# Patient Record
Sex: MALE | Race: OTHER | Employment: FULL TIME | URBAN - NONMETROPOLITAN AREA
[De-identification: names, ages, dates, MRNs, and addresses within clinical notes are randomized per-mention and may not be internally consistent; named-entity substitution may affect disease eponyms.]

---

## 2017-09-12 ENCOUNTER — HOSPITAL ENCOUNTER (OUTPATIENT)
Age: 67
Discharge: HOME | End: 2017-09-12
Payer: COMMERCIAL

## 2017-09-12 LAB
ALBUMIN SERPL-MCNC: 4.1 G/DL (ref 3.5–5.1)
ALP BLD-CCNC: 63 U/L (ref 38–126)
ALT SERPL-CCNC: 23 U/L (ref 11–66)
ANION GAP SERPL CALCULATED.3IONS-SCNC: 12 MEQ/L (ref 8–16)
ANISOCYTOSIS: ABNORMAL
AST SERPL-CCNC: 17 U/L (ref 5–40)
BASOPHILS # BLD: 0.7 %
BASOPHILS ABSOLUTE: 0 THOU/MM3 (ref 0–0.1)
BILIRUB SERPL-MCNC: 0.2 MG/DL (ref 0.3–1.2)
BUN BLDV-MCNC: 19 MG/DL (ref 7–22)
CALCIUM SERPL-MCNC: 9.2 MG/DL (ref 8.5–10.5)
CHLORIDE BLD-SCNC: 102 MEQ/L (ref 98–111)
CHOLESTEROL, TOTAL: 131 MG/DL (ref 100–199)
CO2: 29 MEQ/L (ref 23–33)
CREAT SERPL-MCNC: 1 MG/DL (ref 0.4–1.2)
CREATININE, URINE: 125.7 MG/DL
EOSINOPHIL # BLD: 1.7 %
EOSINOPHILS ABSOLUTE: 0.1 THOU/MM3 (ref 0–0.4)
GFR SERPL CREATININE-BSD FRML MDRD: 75 ML/MIN/1.73M2
GLUCOSE BLD-MCNC: 143 MG/DL (ref 70–108)
HCT VFR BLD CALC: 44 % (ref 42–52)
HDLC SERPL-MCNC: 32 MG/DL
HEMOGLOBIN: 14.4 GM/DL (ref 14–18)
HYPOCHROMIA: ABNORMAL
LDL CHOLESTEROL CALCULATED: 75 MG/DL
LYMPHOCYTES # BLD: 31.5 %
LYMPHOCYTES ABSOLUTE: 1.7 THOU/MM3 (ref 1–4.8)
MCH RBC QN AUTO: 25.7 PG (ref 27–31)
MCHC RBC AUTO-ENTMCNC: 32.7 GM/DL (ref 33–37)
MCV RBC AUTO: 78.6 FL (ref 80–94)
MICROALBUMIN UR-MCNC: < 1.2 MG/DL
MICROALBUMIN/CREAT UR-RTO: 10 MG/G (ref 0–30)
MICROCYTES: ABNORMAL
MONOCYTES # BLD: 5.9 %
MONOCYTES ABSOLUTE: 0.3 THOU/MM3 (ref 0.4–1.3)
NUCLEATED RED BLOOD CELLS: 0 /100 WBC
PDW BLD-RTO: 14.6 % (ref 11.5–14.5)
PLATELET # BLD: 183 THOU/MM3 (ref 130–400)
PMV BLD AUTO: 8.7 MCM (ref 7.4–10.4)
POTASSIUM SERPL-SCNC: 4.4 MEQ/L (ref 3.5–5.2)
PROSTATE SPECIFIC ANTIGEN: 0.91 NG/ML (ref 0–1)
RBC # BLD: 5.59 MILL/MM3 (ref 4.7–6.1)
RBC # BLD: NORMAL 10*6/UL
SEG NEUTROPHILS: 60.2 %
SEGMENTED NEUTROPHILS ABSOLUTE COUNT: 3.3 THOU/MM3 (ref 1.8–7.7)
SODIUM BLD-SCNC: 143 MEQ/L (ref 135–145)
TOTAL PROTEIN: 7.1 G/DL (ref 6.1–8)
TRIGL SERPL-MCNC: 119 MG/DL (ref 0–199)
TSH SERPL DL<=0.05 MIU/L-ACNC: 1.75 UIU/ML (ref 0.4–4.2)
WBC # BLD: 5.5 THOU/MM3 (ref 4.8–10.8)

## 2017-09-12 PROCEDURE — 80061 LIPID PANEL: CPT

## 2017-09-12 PROCEDURE — 36415 COLL VENOUS BLD VENIPUNCTURE: CPT

## 2017-09-12 PROCEDURE — 80050 GENERAL HEALTH PANEL: CPT

## 2017-09-12 PROCEDURE — G0103 PSA SCREENING: HCPCS

## 2017-09-12 PROCEDURE — 82043 UR ALBUMIN QUANTITATIVE: CPT

## 2018-02-13 ENCOUNTER — HOSPITAL ENCOUNTER (OUTPATIENT)
Age: 68
Setting detail: OBSERVATION
Discharge: HOME | End: 2018-02-14
Attending: EMERGENCY MEDICINE | Admitting: INTERNAL MEDICINE
Payer: COMMERCIAL

## 2018-02-13 ENCOUNTER — APPOINTMENT (OUTPATIENT)
Dept: GENERAL RADIOLOGY | Age: 68
End: 2018-02-13
Payer: COMMERCIAL

## 2018-02-13 DIAGNOSIS — R07.89 OTHER CHEST PAIN: Primary | ICD-10-CM

## 2018-02-13 PROBLEM — R07.9 CHEST PAIN: Status: ACTIVE | Noted: 2018-02-13

## 2018-02-13 LAB
ANION GAP SERPL CALCULATED.3IONS-SCNC: 16 MEQ/L (ref 8–16)
BASOPHILS # BLD: 0.6 %
BASOPHILS ABSOLUTE: 0 THOU/MM3 (ref 0–0.1)
BUN BLDV-MCNC: 17 MG/DL (ref 7–22)
CALCIUM SERPL-MCNC: 9.6 MG/DL (ref 8.5–10.5)
CHLORIDE BLD-SCNC: 101 MEQ/L (ref 98–111)
CO2: 28 MEQ/L (ref 23–33)
CREAT SERPL-MCNC: 1 MG/DL (ref 0.4–1.2)
D-DIMER QUANTITATIVE: 518 NG/ML FEU (ref 0–500)
EOSINOPHIL # BLD: 1.4 %
EOSINOPHILS ABSOLUTE: 0.1 THOU/MM3 (ref 0–0.4)
GFR SERPL CREATININE-BSD FRML MDRD: 75 ML/MIN/1.73M2
GLUCOSE BLD-MCNC: 152 MG/DL (ref 70–108)
HCT VFR BLD CALC: 46.1 % (ref 42–52)
HEMOGLOBIN: 15 GM/DL (ref 14–18)
HYPOCHROMIA: ABNORMAL
LIPASE: 36.1 U/L (ref 5.6–51.3)
LYMPHOCYTES # BLD: 31.6 %
LYMPHOCYTES ABSOLUTE: 1.8 THOU/MM3 (ref 1–4.8)
MCH RBC QN AUTO: 25.3 PG (ref 27–31)
MCHC RBC AUTO-ENTMCNC: 32.5 GM/DL (ref 33–37)
MCV RBC AUTO: 77.9 FL (ref 80–94)
MICROCYTES: ABNORMAL
MONOCYTES # BLD: 5.7 %
MONOCYTES ABSOLUTE: 0.3 THOU/MM3 (ref 0.4–1.3)
NUCLEATED RED BLOOD CELLS: 0 /100 WBC
OSMOLALITY CALCULATION: 293.2 MOSMOL/KG (ref 275–300)
PDW BLD-RTO: 14.4 % (ref 11.5–14.5)
PLATELET # BLD: 219 THOU/MM3 (ref 130–400)
PMV BLD AUTO: 9.1 FL (ref 7.4–10.4)
POTASSIUM SERPL-SCNC: 3.8 MEQ/L (ref 3.5–5.2)
RBC # BLD: 5.91 MILL/MM3 (ref 4.7–6.1)
SEG NEUTROPHILS: 60.7 %
SEGMENTED NEUTROPHILS ABSOLUTE COUNT: 3.5 THOU/MM3 (ref 1.8–7.7)
SODIUM BLD-SCNC: 145 MEQ/L (ref 135–145)
TROPONIN T: < 0.01 NG/ML
TROPONIN T: < 0.01 NG/ML
WBC # BLD: 5.8 THOU/MM3 (ref 4.8–10.8)

## 2018-02-13 PROCEDURE — G0378 HOSPITAL OBSERVATION PER HR: HCPCS

## 2018-02-13 PROCEDURE — 6370000000 HC RX 637 (ALT 250 FOR IP): Performed by: INTERNAL MEDICINE

## 2018-02-13 PROCEDURE — 99285 EMERGENCY DEPT VISIT HI MDM: CPT

## 2018-02-13 PROCEDURE — 84484 ASSAY OF TROPONIN QUANT: CPT

## 2018-02-13 PROCEDURE — 36415 COLL VENOUS BLD VENIPUNCTURE: CPT

## 2018-02-13 PROCEDURE — 83690 ASSAY OF LIPASE: CPT

## 2018-02-13 PROCEDURE — 71046 X-RAY EXAM CHEST 2 VIEWS: CPT

## 2018-02-13 PROCEDURE — 85379 FIBRIN DEGRADATION QUANT: CPT

## 2018-02-13 PROCEDURE — 85025 COMPLETE CBC W/AUTO DIFF WBC: CPT

## 2018-02-13 PROCEDURE — 2580000003 HC RX 258: Performed by: INTERNAL MEDICINE

## 2018-02-13 PROCEDURE — 93005 ELECTROCARDIOGRAM TRACING: CPT | Performed by: EMERGENCY MEDICINE

## 2018-02-13 PROCEDURE — 80048 BASIC METABOLIC PNL TOTAL CA: CPT

## 2018-02-13 RX ORDER — ASPIRIN 325 MG
325 TABLET ORAL DAILY
Status: DISCONTINUED | OUTPATIENT
Start: 2018-02-13 | End: 2018-02-14 | Stop reason: HOSPADM

## 2018-02-13 RX ORDER — SODIUM CHLORIDE 0.9 % (FLUSH) 0.9 %
10 SYRINGE (ML) INJECTION PRN
Status: DISCONTINUED | OUTPATIENT
Start: 2018-02-13 | End: 2018-02-14 | Stop reason: HOSPADM

## 2018-02-13 RX ORDER — METOPROLOL SUCCINATE 25 MG/1
25 TABLET, EXTENDED RELEASE ORAL DAILY
Status: DISCONTINUED | OUTPATIENT
Start: 2018-02-14 | End: 2018-02-14 | Stop reason: HOSPADM

## 2018-02-13 RX ORDER — CLONAZEPAM 0.5 MG/1
0.5 TABLET ORAL NIGHTLY PRN
COMMUNITY

## 2018-02-13 RX ORDER — ZOLPIDEM TARTRATE 10 MG/1
10 TABLET ORAL NIGHTLY PRN
COMMUNITY

## 2018-02-13 RX ORDER — POTASSIUM CHLORIDE 7.45 MG/ML
10 INJECTION INTRAVENOUS PRN
Status: DISCONTINUED | OUTPATIENT
Start: 2018-02-13 | End: 2018-02-14 | Stop reason: HOSPADM

## 2018-02-13 RX ORDER — SODIUM CHLORIDE 0.9 % (FLUSH) 0.9 %
10 SYRINGE (ML) INJECTION EVERY 12 HOURS SCHEDULED
Status: DISCONTINUED | OUTPATIENT
Start: 2018-02-13 | End: 2018-02-14 | Stop reason: HOSPADM

## 2018-02-13 RX ORDER — ACETAMINOPHEN 325 MG/1
650 TABLET ORAL EVERY 4 HOURS PRN
Status: DISCONTINUED | OUTPATIENT
Start: 2018-02-13 | End: 2018-02-14 | Stop reason: HOSPADM

## 2018-02-13 RX ORDER — SODIUM CHLORIDE 9 MG/ML
INJECTION, SOLUTION INTRAVENOUS CONTINUOUS
Status: DISCONTINUED | OUTPATIENT
Start: 2018-02-13 | End: 2018-02-14 | Stop reason: HOSPADM

## 2018-02-13 RX ORDER — ROSUVASTATIN CALCIUM 10 MG/1
10 TABLET, COATED ORAL EVERY OTHER DAY
Status: ON HOLD | COMMUNITY
End: 2021-02-27 | Stop reason: SDUPTHER

## 2018-02-13 RX ORDER — ONDANSETRON 2 MG/ML
4 INJECTION INTRAMUSCULAR; INTRAVENOUS EVERY 6 HOURS PRN
Status: DISCONTINUED | OUTPATIENT
Start: 2018-02-13 | End: 2018-02-14 | Stop reason: HOSPADM

## 2018-02-13 RX ORDER — ZOLPIDEM TARTRATE 5 MG/1
5 TABLET ORAL NIGHTLY
Status: DISCONTINUED | OUTPATIENT
Start: 2018-02-13 | End: 2018-02-14 | Stop reason: HOSPADM

## 2018-02-13 RX ADMIN — ZOLPIDEM TARTRATE 5 MG: 5 TABLET ORAL at 22:44

## 2018-02-13 RX ADMIN — SODIUM CHLORIDE: 9 INJECTION, SOLUTION INTRAVENOUS at 21:39

## 2018-02-13 ASSESSMENT — PAIN DESCRIPTION - DESCRIPTORS
DESCRIPTORS: TIGHTNESS
DESCRIPTORS: PRESSURE
DESCRIPTORS: PRESSURE
DESCRIPTORS: TIGHTNESS
DESCRIPTORS: PRESSURE

## 2018-02-13 ASSESSMENT — PAIN DESCRIPTION - FREQUENCY
FREQUENCY: INTERMITTENT
FREQUENCY: CONTINUOUS
FREQUENCY: INTERMITTENT
FREQUENCY: CONTINUOUS

## 2018-02-13 ASSESSMENT — PAIN DESCRIPTION - PAIN TYPE
TYPE: ACUTE PAIN

## 2018-02-13 ASSESSMENT — PAIN SCALES - GENERAL
PAINLEVEL_OUTOF10: 5
PAINLEVEL_OUTOF10: 5
PAINLEVEL_OUTOF10: 4
PAINLEVEL_OUTOF10: 4
PAINLEVEL_OUTOF10: 5

## 2018-02-13 ASSESSMENT — ENCOUNTER SYMPTOMS
ABDOMINAL PAIN: 0
CHEST TIGHTNESS: 0
BACK PAIN: 0
SHORTNESS OF BREATH: 1
VOMITING: 0
DIARRHEA: 0
CONSTIPATION: 0
COUGH: 0
NAUSEA: 0
WHEEZING: 0
RHINORRHEA: 0
SORE THROAT: 0
BLOOD IN STOOL: 0

## 2018-02-13 ASSESSMENT — PAIN DESCRIPTION - ORIENTATION
ORIENTATION: MID;LOWER
ORIENTATION: MID;LEFT
ORIENTATION: MID;LEFT
ORIENTATION: MID

## 2018-02-13 ASSESSMENT — PAIN DESCRIPTION - LOCATION
LOCATION: CHEST

## 2018-02-13 NOTE — ED NOTES
Pt states that pressure continues to mid chest and upper epigastric areas. Pt rates pain as 5/10. Pt states tolerable. Pt voices no other c/o or needs at this time. UPdate given. Will continue to monitor.      Brianda Klein RN  02/13/18 3732

## 2018-02-13 NOTE — ED NOTES
Pt states that tightness continues in chest area rates as 5/10. Pt labs reviewed with him. Pt voices no other c/o or needs. Update given.      Jeanna Wynn RN  02/13/18 4496

## 2018-02-13 NOTE — ED PROVIDER NOTES
ms  QTc: 450 ms  P-R-T axes: 38, -59, 51  Sinus rhythm with premature atrial complexes  Left anterior fascicular block  No STEMI    RADIOLOGY: non-plain film images(s) such as CT, Ultrasound and MRI are read by the radiologist.    CTA CHEST W WO CONTRAST   Final Result    No pulmonary emboli or pulmonary infiltrates. **This report has been created using voice recognition software. It may contain minor errors which are inherent in voice recognition technology. **      Final report electronically signed by Dr. Yue Kathleen on 2/14/2018 3:37 PM      XR CHEST STANDARD (2 VW)   Final Result      No acute intrathoracic process. **This report has been created using voice recognition software. It may contain minor errors which are inherent in voice recognition technology. **      Final report electronically signed by Dr. Kashif Yanez on 2/13/2018 3:23 PM          LABS:   Labs Reviewed   CBC WITH AUTO DIFFERENTIAL - Abnormal; Notable for the following:        Result Value    MCV 77.9 (*)     MCH 25.3 (*)     MCHC 32.5 (*)     Monocytes # 0.3 (*)     All other components within normal limits   BASIC METABOLIC PANEL - Abnormal; Notable for the following:     Glucose 152 (*)     All other components within normal limits   GLOMERULAR FILTRATION RATE, ESTIMATED - Abnormal; Notable for the following:     Est, Glom Filt Rate 75 (*)     All other components within normal limits   D-DIMER, QUANTITATIVE - Abnormal; Notable for the following:     D-Dimer, Quant 518.00 (*)     All other components within normal limits   TROPONIN   LIPASE   ANION GAP   OSMOLALITY   TROPONIN   TROPONIN   LIPID PANEL       EMERGENCY DEPARTMENT COURSE:   Vitals:    Vitals:    02/14/18 0514 02/14/18 0516 02/14/18 0824 02/14/18 1551   BP: (!) 85/47 (!) 97/55 117/68 128/76   Pulse: 66 86 68 90   Resp: 16 18 18   Temp: 98.1 °F (36.7 °C)  97.4 °F (36.3 °C) 97.9 °F (36.6 °C)   TempSrc: Oral  Oral Oral   SpO2: 94%  94% 99%   Weight:

## 2018-02-13 NOTE — ED TRIAGE NOTES
Pt states that started with chest tightness and now gotten worse. EKG done in triage shown to [facility] in triage. IV started with lab draw.

## 2018-02-14 ENCOUNTER — APPOINTMENT (OUTPATIENT)
Dept: CT IMAGING | Age: 68
End: 2018-02-14
Payer: COMMERCIAL

## 2018-02-14 ENCOUNTER — APPOINTMENT (OUTPATIENT)
Dept: NON INVASIVE DIAGNOSTICS | Age: 68
End: 2018-02-14
Payer: COMMERCIAL

## 2018-02-14 VITALS
SYSTOLIC BLOOD PRESSURE: 128 MMHG | OXYGEN SATURATION: 99 % | DIASTOLIC BLOOD PRESSURE: 76 MMHG | WEIGHT: 169.4 LBS | RESPIRATION RATE: 18 BRPM | BODY MASS INDEX: 26.59 KG/M2 | HEIGHT: 67 IN | TEMPERATURE: 97.9 F | HEART RATE: 90 BPM

## 2018-02-14 PROBLEM — E11.10 UNCONTROLLED TYPE 2 DIABETES MELLITUS WITH KETOACIDOSIS WITHOUT COMA, WITHOUT LONG-TERM CURRENT USE OF INSULIN (HCC): Status: ACTIVE | Noted: 2018-02-14

## 2018-02-14 PROBLEM — I10 ESSENTIAL HYPERTENSION: Status: ACTIVE | Noted: 2018-02-14

## 2018-02-14 LAB
CHOLESTEROL, TOTAL: 149 MG/DL (ref 100–199)
EKG ATRIAL RATE: 66 BPM
EKG P AXIS: 51 DEGREES
EKG P-R INTERVAL: 174 MS
EKG Q-T INTERVAL: 388 MS
EKG QRS DURATION: 80 MS
EKG QTC CALCULATION (BAZETT): 406 MS
EKG R AXIS: -45 DEGREES
EKG T AXIS: 22 DEGREES
EKG VENTRICULAR RATE: 66 BPM
HDLC SERPL-MCNC: 34 MG/DL
LDL CHOLESTEROL CALCULATED: 95 MG/DL
LV EF: 64 %
LVEF MODALITY: NORMAL
TRIGL SERPL-MCNC: 100 MG/DL (ref 0–199)
TROPONIN T: < 0.01 NG/ML

## 2018-02-14 PROCEDURE — 71275 CT ANGIOGRAPHY CHEST: CPT

## 2018-02-14 PROCEDURE — 6360000004 HC RX CONTRAST MEDICATION: Performed by: INTERNAL MEDICINE

## 2018-02-14 PROCEDURE — 93017 CV STRESS TEST TRACING ONLY: CPT | Performed by: INTERNAL MEDICINE

## 2018-02-14 PROCEDURE — 99223 1ST HOSP IP/OBS HIGH 75: CPT | Performed by: INTERNAL MEDICINE

## 2018-02-14 PROCEDURE — 93010 ELECTROCARDIOGRAM REPORT: CPT | Performed by: INTERNAL MEDICINE

## 2018-02-14 PROCEDURE — 6370000000 HC RX 637 (ALT 250 FOR IP): Performed by: INTERNAL MEDICINE

## 2018-02-14 PROCEDURE — 78452 HT MUSCLE IMAGE SPECT MULT: CPT | Performed by: INTERNAL MEDICINE

## 2018-02-14 PROCEDURE — G0378 HOSPITAL OBSERVATION PER HR: HCPCS

## 2018-02-14 PROCEDURE — 93005 ELECTROCARDIOGRAM TRACING: CPT | Performed by: INTERNAL MEDICINE

## 2018-02-14 PROCEDURE — 3430000000 HC RX DIAGNOSTIC RADIOPHARMACEUTICAL: Performed by: INTERNAL MEDICINE

## 2018-02-14 PROCEDURE — 80061 LIPID PANEL: CPT

## 2018-02-14 PROCEDURE — 84484 ASSAY OF TROPONIN QUANT: CPT

## 2018-02-14 PROCEDURE — A9500 TC99M SESTAMIBI: HCPCS | Performed by: INTERNAL MEDICINE

## 2018-02-14 PROCEDURE — 36415 COLL VENOUS BLD VENIPUNCTURE: CPT

## 2018-02-14 RX ADMIN — Medication 32.9 MILLICURIE: at 12:15

## 2018-02-14 RX ADMIN — Medication 9.7 MILLICURIE: at 11:03

## 2018-02-14 RX ADMIN — NITROGLYCERIN 1 INCH: 20 OINTMENT TOPICAL at 00:50

## 2018-02-14 RX ADMIN — IOPAMIDOL 85 ML: 755 INJECTION, SOLUTION INTRAVENOUS at 14:55

## 2018-02-14 ASSESSMENT — PAIN DESCRIPTION - LOCATION
LOCATION: CHEST
LOCATION: CHEST

## 2018-02-14 ASSESSMENT — PAIN DESCRIPTION - FREQUENCY
FREQUENCY: INTERMITTENT
FREQUENCY: INTERMITTENT

## 2018-02-14 ASSESSMENT — PAIN SCALES - GENERAL
PAINLEVEL_OUTOF10: 1
PAINLEVEL_OUTOF10: 4
PAINLEVEL_OUTOF10: 0

## 2018-02-14 ASSESSMENT — PAIN DESCRIPTION - DESCRIPTORS
DESCRIPTORS: PRESSURE
DESCRIPTORS: PRESSURE

## 2018-02-14 ASSESSMENT — PAIN DESCRIPTION - PAIN TYPE
TYPE: ACUTE PAIN
TYPE: ACUTE PAIN

## 2018-02-14 ASSESSMENT — PAIN DESCRIPTION - ORIENTATION
ORIENTATION: MID;LEFT
ORIENTATION: LEFT;MID

## 2018-02-14 NOTE — CONSULTS
Take 5 mg by mouth daily  aspirin 81 MG tablet, Take 81 mg by mouth daily  cetirizine (ZYRTEC) 10 MG tablet, Take 10 mg by mouth daily    Allergies:    Review of patient's allergies indicates no known allergies. Social History:    reports that he has quit smoking. He has never used smokeless tobacco. He reports that he drinks alcohol. He reports that he does not use drugs. Family History:   family history is not on file. REVIEW OF SYSTEMS:  As above in the HPI, otherwise negative    PHYSICAL EXAM:    Vitals:  /68   Pulse 68   Temp 97.4 °F (36.3 °C) (Oral)   Resp 18   Ht 5' 7\" (1.702 m)   Wt 169 lb 6.4 oz (76.8 kg)   SpO2 94%   BMI 26.53 kg/m²     General Appearance: Alert and responsive, well developed and well- nourished, in no form of acute distress  Head: normocephalic and atraumatic  Eyes: pupils equal, round, and reactive to light, extraocular eye movements intact, conjunctivae normal  Neck: supple and non-tender without mass, no thyromegaly or thyroid nodules, no cervical lymphadenopathy  Pulmonary/Chest: clear to auscultation bilaterally- no wheezes, rales or rhonchi, normal air movement, no respiratory distress  Cardiovascular: Carotid and Femoral arteries are well felt, No bruit, NO JVD,   normal rate, regular rhythm, normal S1 and S2, no murmurs, rubs, clicks, or gallops,   Abdomen: soft, non-tender, non-distended, normal bowel sounds, no masses or organomegaly  ISAURO: no flank or CVA tenderness  Skin: warm and dry, no rash or erythema, No subcutaneous nodules  Extremities: no cyanosis or clubbing , NO Pedal/ptetibial edema, NO muscle or joint tenderness  Musculoskeletal: normal range of motion, no joint swelling, deformity or tenderness  CNS: AAOx3, Cranial nerves intact, sensation intact to touch and pain, Normal muscle strength and tone, DTR intact, No focal sign   Psychiatry: Normal Judgement, appropriate mood and affect.   LABS:  Lab Results   Component Value Date    TROPONINT <

## 2018-02-14 NOTE — FLOWSHEET NOTE
02/14/18 8740   Provider Notification   Reason for Communication Review case   Provider Name Dr. Saleem Araujo   Provider Notification Physician   Method of Communication Secure Message   Response Waiting for response   Notification Time 5554     RN perfect served Dr. Saleem Araujo:  Lizbeth Castro: [id number] Requesting Doctor: [address] [place] [address] Reason for Consult: chest pain. Pt to go for lexiscan stress test this morning. Stress lab stated you are to see before he goes down. \"

## 2018-02-14 NOTE — PLAN OF CARE
Problem: Cardiac Output - Decreased:  Goal: Hemodynamic stability will improve  Hemodynamic stability will improve  Outcome: Ongoing  Blood pressure within normal limits this shift. Patient displays no bleeding abnormalities. Monitoring labs frequently for any abnormalities. Problem: Tissue Perfusion - Cardiopulmonary, Altered:  Goal: Absence of angina  Absence of angina  Outcome: Ongoing  Patient displays no chest pain or angina this shift. Will continue to monitor for cardiac abnormalities. Comments: Care plan reviewed with patient. Patient verbalize understanding of the plan of care and contribute to goal setting.

## 2018-02-14 NOTE — DISCHARGE SUMMARY
acceptable limits. The heart size is normal. There is no pericardial or pleural effusion. Carlus Plank is no mediastinal, axillary or hilar adenopathy. The lungs are clear. There are no infiltrates.  No suspicious osseous lesions are present.    Multiple hepatic cysts. Mild thickening of the right adrenal gland versus small nodule. Probable exophytic right renal subcentimeter cyst   Impression:      No pulmonary emboli or pulmonary infiltrates. Exercise cardiolite stress test:  No stress induced reversible ischemia    Assessment and Plan   1. Chest pain.   2. HTN  3. DM 2     Treatments:   ASA, BB, statin, lovenox    Disposition:  home    Signed:  José Miguel Bhatt  2/14/2018, 4:14 PM

## 2018-02-14 NOTE — CARE COORDINATION
Denies needs. Discharge plan discussed by  and . Discharge plan reviewed with patient/ family. Patient/ family verbalize understanding of discharge plan and are in agreement with plan. Understanding was demonstrated using the teach back method. IMM Letter  Observation Status Letter date given[de-identified] 02/13/18  Observation Status Letter time given[de-identified] 2200  Observation Status Letter given to Patient/Family/Significant other/Guardian/POA/by[de-identified] Notice of observation signed with primary RN. Verbal explanation given at this time. No questions or concerns voiced.

## 2018-02-15 LAB
EKG ATRIAL RATE: 91 BPM
EKG P AXIS: 38 DEGREES
EKG P-R INTERVAL: 160 MS
EKG Q-T INTERVAL: 366 MS
EKG QRS DURATION: 80 MS
EKG QTC CALCULATION (BAZETT): 450 MS
EKG R AXIS: -59 DEGREES
EKG T AXIS: 51 DEGREES
EKG VENTRICULAR RATE: 91 BPM

## 2018-02-15 PROCEDURE — 93010 ELECTROCARDIOGRAM REPORT: CPT | Performed by: INTERNAL MEDICINE

## 2018-02-21 ASSESSMENT — ENCOUNTER SYMPTOMS
VOMITING: 0
WHEEZING: 0
DIARRHEA: 0
BLOOD IN STOOL: 0
RHINORRHEA: 0
CHEST TIGHTNESS: 0
SORE THROAT: 0
SHORTNESS OF BREATH: 1
BACK PAIN: 0
ABDOMINAL PAIN: 0
CONSTIPATION: 0
COUGH: 0
NAUSEA: 0

## 2018-03-12 ENCOUNTER — OFFICE VISIT (OUTPATIENT)
Dept: CARDIOLOGY CLINIC | Age: 68
End: 2018-03-12
Payer: COMMERCIAL

## 2018-03-12 VITALS
HEART RATE: 80 BPM | DIASTOLIC BLOOD PRESSURE: 84 MMHG | SYSTOLIC BLOOD PRESSURE: 134 MMHG | BODY MASS INDEX: 27.92 KG/M2 | HEIGHT: 67 IN | WEIGHT: 177.9 LBS

## 2018-03-12 DIAGNOSIS — I25.10 ASCVD (ARTERIOSCLEROTIC CARDIOVASCULAR DISEASE): Primary | ICD-10-CM

## 2018-03-12 DIAGNOSIS — R53.83 OTHER FATIGUE: ICD-10-CM

## 2018-03-12 PROCEDURE — G8419 CALC BMI OUT NRM PARAM NOF/U: HCPCS | Performed by: INTERNAL MEDICINE

## 2018-03-12 PROCEDURE — 4040F PNEUMOC VAC/ADMIN/RCVD: CPT | Performed by: INTERNAL MEDICINE

## 2018-03-12 PROCEDURE — 1036F TOBACCO NON-USER: CPT | Performed by: INTERNAL MEDICINE

## 2018-03-12 PROCEDURE — G8427 DOCREV CUR MEDS BY ELIG CLIN: HCPCS | Performed by: INTERNAL MEDICINE

## 2018-03-12 PROCEDURE — G8598 ASA/ANTIPLAT THER USED: HCPCS | Performed by: INTERNAL MEDICINE

## 2018-03-12 PROCEDURE — G8482 FLU IMMUNIZE ORDER/ADMIN: HCPCS | Performed by: INTERNAL MEDICINE

## 2018-03-12 PROCEDURE — 99204 OFFICE O/P NEW MOD 45 MIN: CPT | Performed by: INTERNAL MEDICINE

## 2018-03-12 PROCEDURE — 3017F COLORECTAL CA SCREEN DOC REV: CPT | Performed by: INTERNAL MEDICINE

## 2018-03-12 PROCEDURE — 1123F ACP DISCUSS/DSCN MKR DOCD: CPT | Performed by: INTERNAL MEDICINE

## 2018-03-12 ASSESSMENT — ENCOUNTER SYMPTOMS
DIARRHEA: 0
ABDOMINAL PAIN: 0
CONSTIPATION: 0
EYE PAIN: 0
BOWEL INCONTINENCE: 0
HOARSE VOICE: 0
CHANGE IN BOWEL HABIT: 0
EYE DISCHARGE: 0
BLOATING: 0
DOUBLE VISION: 0
HEMOPTYSIS: 0
COUGH: 0
SHORTNESS OF BREATH: 0
BLURRED VISION: 0
BACK PAIN: 0
SPUTUM PRODUCTION: 0
ORTHOPNEA: 0

## 2018-03-12 NOTE — PROGRESS NOTES
New pt ref from PCP for chest tightness   Pt was [facility] for chest tightness   denies SOB
normal. No respiratory distress. He has no wheezes. He has no rales. Abdominal: Soft. Bowel sounds are normal. He exhibits no distension. There is no tenderness. Musculoskeletal: Normal range of motion. He exhibits no edema. Neurological: He is alert and oriented to person, place, and time. No cranial nerve deficit. Coordination normal.   Skin: Skin is warm and dry. Psychiatric: He has a normal mood and affect. Nursing note and vitals reviewed. No results found for: CKTOTAL, CKMB, CKMBINDEX    Lab Results   Component Value Date    WBC 5.8 02/13/2018    RBC 5.91 02/13/2018    HGB 15.0 02/13/2018    HCT 46.1 02/13/2018    MCV 77.9 02/13/2018    MCH 25.3 02/13/2018    MCHC 32.5 02/13/2018    RDW 14.4 02/13/2018     02/13/2018    MPV 9.1 02/13/2018       Lab Results   Component Value Date     02/13/2018    K 3.8 02/13/2018     02/13/2018    CO2 28 02/13/2018    BUN 17 02/13/2018    LABALBU 4.1 09/12/2017    CREATININE 1.0 02/13/2018    CALCIUM 9.6 02/13/2018    LABGLOM 75 02/13/2018    GLUCOSE 152 02/13/2018       Lab Results   Component Value Date    ALKPHOS 63 09/12/2017    ALT 23 09/12/2017    AST 17 09/12/2017    PROT 7.1 09/12/2017    BILITOT 0.2 09/12/2017    LABALBU 4.1 09/12/2017       No results found for: MG    No results found for: INR, PROTIME      No results found for: LABA1C    Lab Results   Component Value Date    TRIG 100 02/14/2018    HDL 34 02/14/2018    LDLCALC 95 02/14/2018       Lab Results   Component Value Date    TSH 1.750 09/12/2017         Testing Reviewed:      I have individually reviewed the below cardiac tests    EKG:SR, PACs    ECHO: No results found for this or any previous visit. STRESS:2/14/18:Imaging Results:Calculated gated LVEF 64 %. The T.I.D. ratio was 1 .   No evidence of significant reversible or fixed defects on scintigraphic  images.     Conclusions      Summary   No evidence of significant reversible or fixed defects on

## 2018-06-15 ENCOUNTER — ANESTHESIA (OUTPATIENT)
Dept: ENDOSCOPY | Age: 68
End: 2018-06-15
Payer: COMMERCIAL

## 2018-06-15 ENCOUNTER — HOSPITAL ENCOUNTER (OUTPATIENT)
Age: 68
Setting detail: OUTPATIENT SURGERY
Discharge: HOME | End: 2018-06-15
Attending: INTERNAL MEDICINE | Admitting: INTERNAL MEDICINE
Payer: COMMERCIAL

## 2018-06-15 ENCOUNTER — ANESTHESIA EVENT (OUTPATIENT)
Dept: ENDOSCOPY | Age: 68
End: 2018-06-15
Payer: COMMERCIAL

## 2018-06-15 VITALS
HEART RATE: 85 BPM | BODY MASS INDEX: 27.88 KG/M2 | WEIGHT: 177.6 LBS | DIASTOLIC BLOOD PRESSURE: 57 MMHG | HEIGHT: 67 IN | SYSTOLIC BLOOD PRESSURE: 97 MMHG | TEMPERATURE: 97.6 F | RESPIRATION RATE: 16 BRPM | OXYGEN SATURATION: 93 %

## 2018-06-15 VITALS
RESPIRATION RATE: 13 BRPM | SYSTOLIC BLOOD PRESSURE: 95 MMHG | DIASTOLIC BLOOD PRESSURE: 52 MMHG | OXYGEN SATURATION: 98 %

## 2018-06-15 PROCEDURE — 88305 TISSUE EXAM BY PATHOLOGIST: CPT

## 2018-06-15 PROCEDURE — 6360000002 HC RX W HCPCS: Performed by: REGISTERED NURSE

## 2018-06-15 PROCEDURE — 3700000000 HC ANESTHESIA ATTENDED CARE: Performed by: INTERNAL MEDICINE

## 2018-06-15 PROCEDURE — 2500000003 HC RX 250 WO HCPCS: Performed by: REGISTERED NURSE

## 2018-06-15 PROCEDURE — 2580000003 HC RX 258: Performed by: INTERNAL MEDICINE

## 2018-06-15 PROCEDURE — 7100000001 HC PACU RECOVERY - ADDTL 15 MIN: Performed by: INTERNAL MEDICINE

## 2018-06-15 PROCEDURE — 7100000000 HC PACU RECOVERY - FIRST 15 MIN: Performed by: INTERNAL MEDICINE

## 2018-06-15 PROCEDURE — 3609010600 HC COLONOSCOPY POLYPECTOMY SNARE/COLD BIOPSY: Performed by: INTERNAL MEDICINE

## 2018-06-15 PROCEDURE — 3700000001 HC ADD 15 MINUTES (ANESTHESIA): Performed by: INTERNAL MEDICINE

## 2018-06-15 RX ORDER — PROPOFOL 10 MG/ML
INJECTION, EMULSION INTRAVENOUS PRN
Status: DISCONTINUED | OUTPATIENT
Start: 2018-06-15 | End: 2018-06-15 | Stop reason: SDUPTHER

## 2018-06-15 RX ORDER — SODIUM CHLORIDE 450 MG/100ML
INJECTION, SOLUTION INTRAVENOUS CONTINUOUS
Status: DISCONTINUED | OUTPATIENT
Start: 2018-06-15 | End: 2018-06-15 | Stop reason: HOSPADM

## 2018-06-15 RX ORDER — KETAMINE HYDROCHLORIDE 50 MG/ML
INJECTION, SOLUTION, CONCENTRATE INTRAMUSCULAR; INTRAVENOUS PRN
Status: DISCONTINUED | OUTPATIENT
Start: 2018-06-15 | End: 2018-06-15 | Stop reason: SDUPTHER

## 2018-06-15 RX ADMIN — KETAMINE HYDROCHLORIDE 20 MG: 50 INJECTION, SOLUTION INTRAMUSCULAR; INTRAVENOUS at 08:13

## 2018-06-15 RX ADMIN — PROPOFOL 300 MG: 10 INJECTION, EMULSION INTRAVENOUS at 08:13

## 2018-06-15 RX ADMIN — SODIUM CHLORIDE: 4.5 INJECTION, SOLUTION INTRAVENOUS at 07:27

## 2018-06-15 ASSESSMENT — PAIN SCALES - GENERAL: PAINLEVEL_OUTOF10: 0

## 2018-10-05 ENCOUNTER — OFFICE VISIT (OUTPATIENT)
Dept: CARDIOLOGY CLINIC | Age: 68
End: 2018-10-05
Payer: COMMERCIAL

## 2018-10-05 VITALS
WEIGHT: 178 LBS | BODY MASS INDEX: 27.94 KG/M2 | DIASTOLIC BLOOD PRESSURE: 70 MMHG | HEART RATE: 72 BPM | HEIGHT: 67 IN | SYSTOLIC BLOOD PRESSURE: 144 MMHG

## 2018-10-05 DIAGNOSIS — R00.2 PALPITATIONS: Primary | ICD-10-CM

## 2018-10-05 PROCEDURE — 1101F PT FALLS ASSESS-DOCD LE1/YR: CPT | Performed by: INTERNAL MEDICINE

## 2018-10-05 PROCEDURE — G8598 ASA/ANTIPLAT THER USED: HCPCS | Performed by: INTERNAL MEDICINE

## 2018-10-05 PROCEDURE — 1123F ACP DISCUSS/DSCN MKR DOCD: CPT | Performed by: INTERNAL MEDICINE

## 2018-10-05 PROCEDURE — G8484 FLU IMMUNIZE NO ADMIN: HCPCS | Performed by: INTERNAL MEDICINE

## 2018-10-05 PROCEDURE — 3017F COLORECTAL CA SCREEN DOC REV: CPT | Performed by: INTERNAL MEDICINE

## 2018-10-05 PROCEDURE — 4040F PNEUMOC VAC/ADMIN/RCVD: CPT | Performed by: INTERNAL MEDICINE

## 2018-10-05 PROCEDURE — G8427 DOCREV CUR MEDS BY ELIG CLIN: HCPCS | Performed by: INTERNAL MEDICINE

## 2018-10-05 PROCEDURE — G8419 CALC BMI OUT NRM PARAM NOF/U: HCPCS | Performed by: INTERNAL MEDICINE

## 2018-10-05 PROCEDURE — 1036F TOBACCO NON-USER: CPT | Performed by: INTERNAL MEDICINE

## 2018-10-05 PROCEDURE — 99213 OFFICE O/P EST LOW 20 MIN: CPT | Performed by: INTERNAL MEDICINE

## 2018-10-05 ASSESSMENT — ENCOUNTER SYMPTOMS
BOWEL INCONTINENCE: 0
BLOATING: 0
DIARRHEA: 0
ORTHOPNEA: 0
EYE PAIN: 0
CONSTIPATION: 0
ABDOMINAL PAIN: 0
SPUTUM PRODUCTION: 0
SHORTNESS OF BREATH: 0
BLURRED VISION: 0
COUGH: 0
CHANGE IN BOWEL HABIT: 0
HEMOPTYSIS: 0
HOARSE VOICE: 0
DOUBLE VISION: 0
BACK PAIN: 0
EYE DISCHARGE: 0

## 2018-10-09 ENCOUNTER — HOSPITAL ENCOUNTER (OUTPATIENT)
Dept: NON INVASIVE DIAGNOSTICS | Age: 68
Discharge: HOME | End: 2018-10-09
Payer: COMMERCIAL

## 2018-10-09 DIAGNOSIS — R00.2 PALPITATIONS: ICD-10-CM

## 2018-10-09 PROCEDURE — 0296T HC EXT ECG RECORDING 2-21 DAY HOOKUP: CPT

## 2021-02-26 ENCOUNTER — HOSPITAL ENCOUNTER (INPATIENT)
Age: 71
LOS: 1 days | Discharge: HOME | DRG: 311 | End: 2021-02-27
Attending: INTERNAL MEDICINE | Admitting: INTERNAL MEDICINE
Payer: MEDICARE

## 2021-02-26 ENCOUNTER — APPOINTMENT (OUTPATIENT)
Dept: NON INVASIVE DIAGNOSTICS | Age: 71
DRG: 311 | End: 2021-02-26
Payer: MEDICARE

## 2021-02-26 ENCOUNTER — APPOINTMENT (OUTPATIENT)
Dept: CT IMAGING | Age: 71
DRG: 311 | End: 2021-02-26
Payer: MEDICARE

## 2021-02-26 DIAGNOSIS — I71.20 THORACIC AORTIC ANEURYSM WITHOUT RUPTURE: ICD-10-CM

## 2021-02-26 DIAGNOSIS — G47.34 IDIOPATHIC SLEEP RELATED NONOBSTRUCTIVE ALVEOLAR HYPOVENTILATION: ICD-10-CM

## 2021-02-26 DIAGNOSIS — E11.10 UNCONTROLLED TYPE 2 DIABETES MELLITUS WITH KETOACIDOSIS WITHOUT COMA, WITHOUT LONG-TERM CURRENT USE OF INSULIN (HCC): ICD-10-CM

## 2021-02-26 DIAGNOSIS — R07.9 CHEST PAIN, UNSPECIFIED TYPE: Primary | ICD-10-CM

## 2021-02-26 LAB
ANION GAP SERPL CALCULATED.3IONS-SCNC: 9 MEQ/L (ref 8–16)
AVERAGE GLUCOSE: 192 MG/DL (ref 70–126)
BASOPHILS # BLD: 0.5 %
BASOPHILS ABSOLUTE: 0 THOU/MM3 (ref 0–0.1)
BUN BLDV-MCNC: 20 MG/DL (ref 7–22)
CALCIUM SERPL-MCNC: 10 MG/DL (ref 8.5–10.5)
CHLORIDE BLD-SCNC: 105 MEQ/L (ref 98–111)
CHOLESTEROL, TOTAL: 190 MG/DL (ref 100–199)
CO2: 27 MEQ/L (ref 23–33)
CREAT SERPL-MCNC: 0.8 MG/DL (ref 0.4–1.2)
EKG ATRIAL RATE: 73 BPM
EKG P AXIS: 58 DEGREES
EKG P-R INTERVAL: 190 MS
EKG Q-T INTERVAL: 382 MS
EKG QRS DURATION: 84 MS
EKG QTC CALCULATION (BAZETT): 420 MS
EKG R AXIS: -55 DEGREES
EKG T AXIS: 41 DEGREES
EKG VENTRICULAR RATE: 73 BPM
EOSINOPHIL # BLD: 0.9 %
EOSINOPHILS ABSOLUTE: 0.1 THOU/MM3 (ref 0–0.4)
ERYTHROCYTE [DISTWIDTH] IN BLOOD BY AUTOMATED COUNT: 13.2 % (ref 11.5–14.5)
ERYTHROCYTE [DISTWIDTH] IN BLOOD BY AUTOMATED COUNT: 38.1 FL (ref 35–45)
GFR SERPL CREATININE-BSD FRML MDRD: > 90 ML/MIN/1.73M2
GLUCOSE BLD-MCNC: 117 MG/DL (ref 70–108)
GLUCOSE BLD-MCNC: 191 MG/DL (ref 70–108)
GLUCOSE BLD-MCNC: 211 MG/DL (ref 70–108)
HBA1C MFR BLD: 8.4 % (ref 4.4–6.4)
HCT VFR BLD CALC: 47.4 % (ref 42–52)
HDLC SERPL-MCNC: 37 MG/DL
HEMOGLOBIN: 15 GM/DL (ref 14–18)
IMMATURE GRANS (ABS): 0.01 THOU/MM3 (ref 0–0.07)
IMMATURE GRANULOCYTES: 0.2 %
LDL CHOLESTEROL CALCULATED: 120 MG/DL
LV EF: 60 %
LVEF MODALITY: NORMAL
LYMPHOCYTES # BLD: 25.5 %
LYMPHOCYTES ABSOLUTE: 1.7 THOU/MM3 (ref 1–4.8)
MAGNESIUM: 1.9 MG/DL (ref 1.6–2.4)
MCH RBC QN AUTO: 25.7 PG (ref 26–33)
MCHC RBC AUTO-ENTMCNC: 31.6 GM/DL (ref 32.2–35.5)
MCV RBC AUTO: 81.2 FL (ref 80–94)
MONOCYTES # BLD: 6.7 %
MONOCYTES ABSOLUTE: 0.4 THOU/MM3 (ref 0.4–1.3)
NUCLEATED RED BLOOD CELLS: 0 /100 WBC
OSMOLALITY CALCULATION: 289 MOSMOL/KG (ref 275–300)
PLATELET # BLD: 198 THOU/MM3 (ref 130–400)
PMV BLD AUTO: 9.7 FL (ref 9.4–12.4)
POTASSIUM SERPL-SCNC: 4 MEQ/L (ref 3.5–5.2)
PRO-BNP: 34.1 PG/ML (ref 0–900)
RBC # BLD: 5.84 MILL/MM3 (ref 4.7–6.1)
SEG NEUTROPHILS: 66.2 %
SEGMENTED NEUTROPHILS ABSOLUTE COUNT: 4.4 THOU/MM3 (ref 1.8–7.7)
SODIUM BLD-SCNC: 141 MEQ/L (ref 135–145)
TRIGL SERPL-MCNC: 167 MG/DL (ref 0–199)
TROPONIN T: < 0.01 NG/ML
WBC # BLD: 6.6 THOU/MM3 (ref 4.8–10.8)

## 2021-02-26 PROCEDURE — 93005 ELECTROCARDIOGRAM TRACING: CPT | Performed by: PHYSICIAN ASSISTANT

## 2021-02-26 PROCEDURE — 71275 CT ANGIOGRAPHY CHEST: CPT

## 2021-02-26 PROCEDURE — 80061 LIPID PANEL: CPT

## 2021-02-26 PROCEDURE — 83735 ASSAY OF MAGNESIUM: CPT

## 2021-02-26 PROCEDURE — 93010 ELECTROCARDIOGRAM REPORT: CPT | Performed by: NUCLEAR MEDICINE

## 2021-02-26 PROCEDURE — 93017 CV STRESS TEST TRACING ONLY: CPT

## 2021-02-26 PROCEDURE — 6370000000 HC RX 637 (ALT 250 FOR IP): Performed by: PHYSICIAN ASSISTANT

## 2021-02-26 PROCEDURE — 3430000000 HC RX DIAGNOSTIC RADIOPHARMACEUTICAL: Performed by: INTERNAL MEDICINE

## 2021-02-26 PROCEDURE — 78452 HT MUSCLE IMAGE SPECT MULT: CPT

## 2021-02-26 PROCEDURE — 83036 HEMOGLOBIN GLYCOSYLATED A1C: CPT

## 2021-02-26 PROCEDURE — 6360000004 HC RX CONTRAST MEDICATION: Performed by: PHYSICIAN ASSISTANT

## 2021-02-26 PROCEDURE — 6360000002 HC RX W HCPCS

## 2021-02-26 PROCEDURE — 84484 ASSAY OF TROPONIN QUANT: CPT

## 2021-02-26 PROCEDURE — 82948 REAGENT STRIP/BLOOD GLUCOSE: CPT

## 2021-02-26 PROCEDURE — 85025 COMPLETE CBC W/AUTO DIFF WBC: CPT

## 2021-02-26 PROCEDURE — 83880 ASSAY OF NATRIURETIC PEPTIDE: CPT

## 2021-02-26 PROCEDURE — 99223 1ST HOSP IP/OBS HIGH 75: CPT | Performed by: INTERNAL MEDICINE

## 2021-02-26 PROCEDURE — 80048 BASIC METABOLIC PNL TOTAL CA: CPT

## 2021-02-26 PROCEDURE — 93306 TTE W/DOPPLER COMPLETE: CPT

## 2021-02-26 PROCEDURE — 36415 COLL VENOUS BLD VENIPUNCTURE: CPT

## 2021-02-26 PROCEDURE — 99285 EMERGENCY DEPT VISIT HI MDM: CPT

## 2021-02-26 PROCEDURE — 6370000000 HC RX 637 (ALT 250 FOR IP): Performed by: INTERNAL MEDICINE

## 2021-02-26 PROCEDURE — A9500 TC99M SESTAMIBI: HCPCS | Performed by: INTERNAL MEDICINE

## 2021-02-26 PROCEDURE — 1200000003 HC TELEMETRY R&B

## 2021-02-26 PROCEDURE — 6360000002 HC RX W HCPCS: Performed by: INTERNAL MEDICINE

## 2021-02-26 PROCEDURE — 2580000003 HC RX 258: Performed by: INTERNAL MEDICINE

## 2021-02-26 RX ORDER — DOCUSATE SODIUM 100 MG/1
100 CAPSULE, LIQUID FILLED ORAL DAILY PRN
COMMUNITY

## 2021-02-26 RX ORDER — NITROGLYCERIN 20 MG/100ML
5-200 INJECTION INTRAVENOUS CONTINUOUS
Status: DISCONTINUED | OUTPATIENT
Start: 2021-02-26 | End: 2021-02-27 | Stop reason: HOSPADM

## 2021-02-26 RX ORDER — POLYETHYLENE GLYCOL 3350 17 G/17G
17 POWDER, FOR SOLUTION ORAL DAILY PRN
Status: DISCONTINUED | OUTPATIENT
Start: 2021-02-26 | End: 2021-02-27 | Stop reason: HOSPADM

## 2021-02-26 RX ORDER — CLONAZEPAM 1 MG/1
0.5 TABLET ORAL NIGHTLY PRN
Status: DISCONTINUED | OUTPATIENT
Start: 2021-02-26 | End: 2021-02-27 | Stop reason: HOSPADM

## 2021-02-26 RX ORDER — SODIUM CHLORIDE 0.9 % (FLUSH) 0.9 %
10 SYRINGE (ML) INJECTION PRN
Status: ACTIVE | OUTPATIENT
Start: 2021-02-26 | End: 2021-02-26

## 2021-02-26 RX ORDER — METOPROLOL SUCCINATE 25 MG/1
25 TABLET, EXTENDED RELEASE ORAL DAILY
Status: DISCONTINUED | OUTPATIENT
Start: 2021-02-26 | End: 2021-02-27 | Stop reason: HOSPADM

## 2021-02-26 RX ORDER — PROMETHAZINE HYDROCHLORIDE 25 MG/1
12.5 TABLET ORAL EVERY 6 HOURS PRN
Status: DISCONTINUED | OUTPATIENT
Start: 2021-02-26 | End: 2021-02-27 | Stop reason: HOSPADM

## 2021-02-26 RX ORDER — NICOTINE POLACRILEX 4 MG
15 LOZENGE BUCCAL PRN
Status: DISCONTINUED | OUTPATIENT
Start: 2021-02-26 | End: 2021-02-27 | Stop reason: HOSPADM

## 2021-02-26 RX ORDER — ACETAMINOPHEN 650 MG/1
650 SUPPOSITORY RECTAL EVERY 6 HOURS PRN
Status: DISCONTINUED | OUTPATIENT
Start: 2021-02-26 | End: 2021-02-27 | Stop reason: HOSPADM

## 2021-02-26 RX ORDER — ASPIRIN 81 MG/1
81 TABLET ORAL DAILY
Status: DISCONTINUED | OUTPATIENT
Start: 2021-02-27 | End: 2021-02-27 | Stop reason: HOSPADM

## 2021-02-26 RX ORDER — ASPIRIN 81 MG/1
81 TABLET, CHEWABLE ORAL DAILY
Status: DISCONTINUED | OUTPATIENT
Start: 2021-02-27 | End: 2021-02-26 | Stop reason: ALTCHOICE

## 2021-02-26 RX ORDER — NITROGLYCERIN 0.4 MG/1
0.4 TABLET SUBLINGUAL EVERY 5 MIN PRN
Status: DISCONTINUED | OUTPATIENT
Start: 2021-02-26 | End: 2021-02-26 | Stop reason: SDUPTHER

## 2021-02-26 RX ORDER — SODIUM CHLORIDE 9 MG/ML
500 INJECTION, SOLUTION INTRAVENOUS CONTINUOUS PRN
Status: ACTIVE | OUTPATIENT
Start: 2021-02-26 | End: 2021-02-26

## 2021-02-26 RX ORDER — SODIUM CHLORIDE 0.9 % (FLUSH) 0.9 %
10 SYRINGE (ML) INJECTION PRN
Status: DISCONTINUED | OUTPATIENT
Start: 2021-02-26 | End: 2021-02-27 | Stop reason: HOSPADM

## 2021-02-26 RX ORDER — ALBUTEROL SULFATE 90 UG/1
2 AEROSOL, METERED RESPIRATORY (INHALATION) PRN
Status: ACTIVE | OUTPATIENT
Start: 2021-02-26 | End: 2021-02-26

## 2021-02-26 RX ORDER — ONDANSETRON 2 MG/ML
4 INJECTION INTRAMUSCULAR; INTRAVENOUS EVERY 6 HOURS PRN
Status: DISCONTINUED | OUTPATIENT
Start: 2021-02-26 | End: 2021-02-27 | Stop reason: HOSPADM

## 2021-02-26 RX ORDER — DEXTROSE MONOHYDRATE 25 G/50ML
12.5 INJECTION, SOLUTION INTRAVENOUS PRN
Status: DISCONTINUED | OUTPATIENT
Start: 2021-02-26 | End: 2021-02-27 | Stop reason: HOSPADM

## 2021-02-26 RX ORDER — AMINOPHYLLINE DIHYDRATE 25 MG/ML
50 INJECTION, SOLUTION INTRAVENOUS PRN
Status: ACTIVE | OUTPATIENT
Start: 2021-02-26 | End: 2021-02-26

## 2021-02-26 RX ORDER — SODIUM CHLORIDE 0.9 % (FLUSH) 0.9 %
10 SYRINGE (ML) INJECTION EVERY 12 HOURS SCHEDULED
Status: DISCONTINUED | OUTPATIENT
Start: 2021-02-26 | End: 2021-02-27 | Stop reason: HOSPADM

## 2021-02-26 RX ORDER — ROSUVASTATIN CALCIUM 10 MG/1
10 TABLET, COATED ORAL NIGHTLY
Status: DISCONTINUED | OUTPATIENT
Start: 2021-02-26 | End: 2021-02-27 | Stop reason: HOSPADM

## 2021-02-26 RX ORDER — ATROPINE SULFATE 0.1 MG/ML
0.5 INJECTION INTRAVENOUS EVERY 5 MIN PRN
Status: ACTIVE | OUTPATIENT
Start: 2021-02-26 | End: 2021-02-26

## 2021-02-26 RX ORDER — METOPROLOL TARTRATE 5 MG/5ML
5 INJECTION INTRAVENOUS EVERY 5 MIN PRN
Status: ACTIVE | OUTPATIENT
Start: 2021-02-26 | End: 2021-02-26

## 2021-02-26 RX ORDER — ASPIRIN 81 MG/1
243 TABLET, CHEWABLE ORAL ONCE
Status: COMPLETED | OUTPATIENT
Start: 2021-02-26 | End: 2021-02-26

## 2021-02-26 RX ORDER — NITROGLYCERIN 0.4 MG/1
0.4 TABLET SUBLINGUAL EVERY 5 MIN PRN
Status: DISCONTINUED | OUTPATIENT
Start: 2021-02-26 | End: 2021-02-27 | Stop reason: HOSPADM

## 2021-02-26 RX ORDER — DEXTROSE MONOHYDRATE 50 MG/ML
100 INJECTION, SOLUTION INTRAVENOUS PRN
Status: DISCONTINUED | OUTPATIENT
Start: 2021-02-26 | End: 2021-02-27 | Stop reason: HOSPADM

## 2021-02-26 RX ORDER — ACETAMINOPHEN 325 MG/1
650 TABLET ORAL EVERY 6 HOURS PRN
Status: DISCONTINUED | OUTPATIENT
Start: 2021-02-26 | End: 2021-02-27 | Stop reason: HOSPADM

## 2021-02-26 RX ADMIN — Medication 9 MILLICURIE: at 14:00

## 2021-02-26 RX ADMIN — ROSUVASTATIN CALCIUM 10 MG: 10 TABLET, FILM COATED ORAL at 20:25

## 2021-02-26 RX ADMIN — ENOXAPARIN SODIUM 80 MG: 80 INJECTION SUBCUTANEOUS at 16:30

## 2021-02-26 RX ADMIN — SODIUM CHLORIDE, PRESERVATIVE FREE 10 ML: 5 INJECTION INTRAVENOUS at 20:25

## 2021-02-26 RX ADMIN — CLONAZEPAM 0.5 MG: 1 TABLET ORAL at 20:25

## 2021-02-26 RX ADMIN — NITROGLYCERIN 0.4 MG: 0.4 TABLET, ORALLY DISINTEGRATING SUBLINGUAL at 09:15

## 2021-02-26 RX ADMIN — ASPIRIN 243 MG: 81 TABLET, CHEWABLE ORAL at 09:15

## 2021-02-26 RX ADMIN — INSULIN LISPRO 2 UNITS: 100 INJECTION, SOLUTION INTRAVENOUS; SUBCUTANEOUS at 16:06

## 2021-02-26 RX ADMIN — NITROGLYCERIN 0.4 MG: 0.4 TABLET, ORALLY DISINTEGRATING SUBLINGUAL at 09:20

## 2021-02-26 RX ADMIN — METOPROLOL SUCCINATE 25 MG: 25 TABLET, FILM COATED, EXTENDED RELEASE ORAL at 20:24

## 2021-02-26 RX ADMIN — Medication 32.1 MILLICURIE: at 14:50

## 2021-02-26 RX ADMIN — IOPAMIDOL 80 ML: 755 INJECTION, SOLUTION INTRAVENOUS at 10:42

## 2021-02-26 RX ADMIN — METFORMIN HYDROCHLORIDE 1000 MG: 500 TABLET ORAL at 16:05

## 2021-02-26 ASSESSMENT — PAIN DESCRIPTION - PROGRESSION: CLINICAL_PROGRESSION: RESOLVED

## 2021-02-26 ASSESSMENT — ENCOUNTER SYMPTOMS
SORE THROAT: 0
SHORTNESS OF BREATH: 0
ABDOMINAL DISTENTION: 0
DIARRHEA: 0
CONSTIPATION: 0
BLOOD IN STOOL: 0
SINUS PRESSURE: 0
VOMITING: 0
NAUSEA: 0
SINUS PAIN: 0
COUGH: 0
ABDOMINAL PAIN: 0
EYE PAIN: 0

## 2021-02-26 ASSESSMENT — PAIN SCALES - GENERAL: PAINLEVEL_OUTOF10: 3

## 2021-02-26 NOTE — ED NOTES
Patient returned from [address] by [address] staff. Patient updated on POC. Patient denies any increasing chest pain. Call light within reach.      Per Faye RN  02/26/21 1100

## 2021-02-26 NOTE — H&P
Internal Medicine  History and Physical    Patient:  Vern Ware  MRN: [id number]      History Obtained From:  patient  PCP: Tomas Rodriguez MD    CHIEF COMPLAINT:  Left chest pain    HISTORY OF PRESENT ILLNESS:   The patient is a 72 y.o. male who presents with with an acute onset left sided CP that woke him from sleep around 4:30 am today. Pain described as pressure, radiated to his left jaw and left shoulder, graded 8/10 at the peak. Associated palpitation. No N/V, no SOB, no dizziness. He took ASA and came to ER. In ED, he was given s/l NTG which relieved the CP. Now CP free. Initial troponin is negative, ekg is non ischemic, CTA chest negative for PE, positive for fibroemphysematous changes, findings of pulm HTN and a 4 cm ascending aorta aneurysm. Past Medical History:        Diagnosis Date    Diabetes mellitus [facility])        Past Surgical History:        Procedure Laterality Date    COLONOSCOPY N/A 6/15/2018    COLONOSCOPY POLYPECTOMY SNARE/COLD BIOPSY performed by Felicia Mujica MD at [place] [address]      right inguinal hernia repair       Medications Prior to Admission:    Prior to Admission medications    Medication Sig Start Date End Date Taking? Authorizing Provider   docusate sodium (COLACE) 100 MG capsule Take 100 mg by mouth daily as needed for Constipation   Yes Historical Provider, MD   metFORMIN (GLUCOPHAGE) 500 MG tablet Take 500 mg by mouth 2 times daily (with meals)   Yes Historical Provider, MD   aspirin 81 MG tablet Take 1 tablet by mouth daily 6/20/18  Yes Felicia Mujica MD   zolpidem (AMBIEN) 10 MG tablet Take 10 mg by mouth nightly as needed for Sleep. Yes Historical Provider, MD   rosuvastatin (CRESTOR) 10 MG tablet Take 10 mg by mouth every other day   Yes Historical Provider, MD   clonazePAM (KLONOPIN) 0.5 MG tablet Take 0.5 mg by mouth nightly as needed.     Historical Provider, MD   cetirizine (ZYRTEC) 10 MG tablet Take 10 mg by mouth as needed     Historical Provider, MD       Allergies:  Patient has no known allergies. Social History:   TOBACCO:   reports that he has quit smoking. His smoking use included cigarettes. He has a 8.00 pack-year smoking history. He has never used smokeless tobacco.  ETOH:   reports current alcohol use. Family History:   History reviewed. No pertinent family history.     REVIEW OF SYSTEMS:  CONSTITUTIONAL:  positive for  malaise  negative for  fevers and chills  EYES:  negative for  visual disturbance, irritation and redness  HEENT:  negative for  sore mouth, sore throat and hoarseness  RESPIRATORY:  negative for  dry cough, dyspnea, wheezing and hemoptysis  CARDIOVASCULAR:  positive for  chest pain, palpitations  negative for  PND, exertional chest pressure/discomfort, fatigue  GASTROINTESTINAL:  negative for nausea, vomiting, change in bowel habits, abdominal pain, abdominal mass and abdominal distention  GENITOURINARY:  negative for frequency, dysuria and hematuria  HEMATOLOGIC/LYMPHATIC:  negative for easy bruising, bleeding and lymphadenopathy  ALLERGIC/IMMUNOLOGIC:  negative for recurrent infections, hay fever and angioedema  MUSCULOSKELETAL:  negative for  myalgias and arthralgias  NEUROLOGICAL:  negative for headaches, dizziness, seizures, memory problems and speech problems  BEHAVIOR/PSYCH:  positive for anxiety and negative for poor appetite, decreased sleep, decreased energy level and depressed mood    Physical Exam:    Vitals: /81   Pulse 78   Temp 98.2 °F (36.8 °C) (Oral)   Resp 16   Ht 5' 7\" (1.702 m)   Wt 172 lb 14.4 oz (78.4 kg)   SpO2 98%   BMI 27.08 kg/m²   CONSTITUTIONAL:  awake, alert, cooperative, no apparent distress, and appears stated age  EYES:  extra-ocular muscles intact  ENT:  normocepalic, without obvious abnormality  NECK:  supple, symmetrical, trachea midline  BACK:  Symmetric, no curvature, spinous processes are non-tender on palpation, paraspinous muscles are non-tender on palpation, no costal vertebral tenderness  LUNGS:  clear to auscultation  CARDIOVASCULAR:  normal S1 and S2 and no edema  ABDOMEN:  No scars, normal bowel sounds, soft, non-distended, non-tender, no masses palpated, no hepatosplenomegally  MUSCULOSKELETAL:  there is no redness, warmth, or swelling of the joints  NEUROLOGIC:  Awake, alert, oriented to name, place and time. Cranial nerves II-XII are grossly intact. Motor is 5 out of 5 bilaterally. SKIN:  normal skin color, texture, turgor      CBC:   Recent Labs     21  0901   WBC 6.6   HGB 15.0        BMP:    Recent Labs     21  0901      K 4.0      CO2 27   BUN 20   CREATININE 0.8   GLUCOSE 191*     Troponin T < 0.010     Pro-BNP 34.1         CTA CHEST W WO CONTRAST   1.  No pulmonary emboli are seen. Mildly aneurysmal ascending thoracic aorta. 2. Fibroemphysematous lungs. Findings of pulmonary arterial hypertension.      Ventricular Rate 73 BPM    Atrial Rate 73 BPM    P-R Interval 190 ms    QRS Duration 84 ms    Q-T Interval 382 ms    QTc Calculation (Bazett) 420 ms    P Axis 58 degrees    R Axis -55 degrees    T Axis 41 degrees   Narrative:     Sinus rhythm with Premature atrial complexes in a pattern of bigeminy   Left anterior fascicular block          Assessment and Plan   1. Chest pain / Unstable angina  2. HLD  3. DM 2  4. CT chest appearance of emphysema(asymptomatic) and pulm HTN    serial troponin/ ekg  Stress test  Echo  ASA, BB, statin  Sc lovenox 1 mg/ Kg q 12  NTG  Cardiology eval  SSI.     Patient Active Problem List   Diagnosis Code    Chest pain R07.9    Other chest pain R07.89    Uncontrolled type 2 diabetes mellitus with ketoacidosis without coma, without long-term current use of insulin (HCC) E11.10    Essential hypertension I10    Thoracic aortic aneurysm without rupture ([facility] [address] I71.2       Jalyn Rosario MD  Admitting Internist

## 2021-02-26 NOTE — ED PROVIDER NOTES
I[facility] COMPLAINT       Chief Complaint   Patient presents with    Chest Pain    Shortness of Breath       Nurses Notes reviewed and I agree except as notedin the HPI. HISTORY OF PRESENT ILLNESS    Marielos Gutierrez is a 72 y.o. male who presents with \"4:30 AM with chest pressure. Also has some shortness of breath. He took 1 baby aspirin and an antacid. He states his pain is under 3. He is still describes it as a pressure. He is a little bit of belching. He has had a stress test in the past.  No nausea vomiting or dizziness. Location/Symptom: Chest pain  Timing/Onset: 0430 am  Context/Setting: home  Quality: pressure  Duration: constant  Modifying Factors: none  Severity: 3    REVIEW OF SYSTEMS     Review of Systems   Constitutional: Negative for chills and fever. HENT: Negative for congestion, ear pain, sinus pressure, sinus pain and sore throat. Eyes: Negative for pain. Respiratory: Negative for cough and shortness of breath. Cardiovascular: Positive for chest pain. Negative for leg swelling. Gastrointestinal: Negative for abdominal distention, abdominal pain, blood in stool, constipation, diarrhea, nausea and vomiting. Genitourinary: Negative for difficulty urinating, frequency and hematuria. Musculoskeletal: Negative for arthralgias. Skin: Negative for rash. Neurological: Negative for dizziness and headaches. All other systems reviewed and are negative. PAST MEDICAL HISTORY    has a past medical history of Diabetes mellitus ([facility] [address] SURGICAL HISTORY      has a past surgical history that includes hernia repair and Colonoscopy (N/A, 6/15/2018).     CURRENT MEDICATIONS       Current Discharge Medication List      CONTINUE these medications which have NOT CHANGED    Details   docusate sodium (COLACE) 100 MG capsule Take 100 mg by mouth daily as needed for Constipation      metFORMIN (GLUCOPHAGE) 500 MG tablet Take 500 mg by mouth 2 times daily (with meals)      aspirin 81 MG tablet Take 1 tablet by mouth daily  Qty: 1 tablet, Refills: 0      zolpidem (AMBIEN) 10 MG tablet Take 10 mg by mouth nightly as needed for Sleep. rosuvastatin (CRESTOR) 10 MG tablet Take 10 mg by mouth every other day      clonazePAM (KLONOPIN) 0.5 MG tablet Take 0.5 mg by mouth nightly as needed. cetirizine (ZYRTEC) 10 MG tablet Take 10 mg by mouth as needed              ALLERGIES     has No Known Allergies. HISTORY     He indicated that his mother is . He indicated that his father is . family history is not on file. SOCIALHISTORY      reports that he has quit smoking. His smoking use included cigarettes. He has a 8.00 pack-year smoking history. He has never used smokeless tobacco. He reports current alcohol use. He reports that he does not use drugs. PHYSICAL EXAM     INITIAL VITALS:  height is 5' 7\" (1.702 m) and weight is 172 lb 14.4 oz (78.4 kg). His oral temperature is 97.7 °F (36.5 °C). His blood pressure is 141/79 (abnormal) and his pulse is 80. His respiration is 16 and oxygen saturation is 97%. Physical Exam  Vitals signs and nursing note reviewed. Constitutional:       Comments: Well Developed Well Nourished Appearing     HENT:      Head: Normocephalic and atraumatic. Eyes:      Pupils: Pupils are equal, round, and reactive to light. Neck:      Musculoskeletal: Normal range of motion and neck supple. Cardiovascular:      Rate and Rhythm: Normal rate and regular rhythm. Heart sounds: Normal heart sounds. Pulmonary:      Effort: Pulmonary effort is normal. No respiratory distress. Breath sounds: Normal breath sounds. No wheezing. Abdominal:      General: Bowel sounds are normal. There is no distension. Palpations: Abdomen is soft. DIFFERENTIAL DIAGNOSIS:   Chest pain.     DIAGNOSTIC RESULTS     EKG: All EKG's are interpreted by the Emergency Department Physician who

## 2021-02-26 NOTE — CONSULTS
Ina Masters      REason Of consultation- chest pain and abn nuc stress    Primary cardiologist- none    HPI  The patient is a 72 y.o. gentleman  presents with  Sudden onset left sided CP that started around  4:30 am this morning. Patient wake up and went to bath room and upon return started to feel chest pain . The Pain described as pressure, radiated to his left jaw and left shoulder, graded 8/10 at the peak, persistent for almost 3 hours until he get to ER. Associated palpitation. But No associated diaphoresis, N/V, no SOB, no dizziness. In ED, he was given s/l NTG x2  And asa  And with that the chest pain totally resolved. So far   Awilda Zhang has been chest pain free. The sets of  troponin are  negative, ekg is non ischemic, CTA chest negative for PE, positive for fibroemphysematous changes, findings of pulm HTN and a 4 cm ascending aorta aneurysm. Had stress and showed mild abnormality and hence cardiology evaluation needed    Chief Complaint   Patient presents with    Chest Pain    Shortness of Breath       Patient Active Problem List   Diagnosis    Chest pain    Other chest pain    Uncontrolled type 2 diabetes mellitus with ketoacidosis without coma, without long-term current use of insulin ([facility] [address]    Essential hypertension    Thoracic aortic aneurysm without rupture [facility])       Past Surgical History:   Procedure Laterality Date    COLONOSCOPY N/A 6/15/2018    COLONOSCOPY POLYPECTOMY SNARE/COLD BIOPSY performed by Duc Gomez MD at [place] [address]      right inguinal hernia repair       No Known Allergies     History reviewed. No pertinent family history.      Social History     Socioeconomic History    Marital status:      Spouse name: Tarsha Ulloa Number of children: Not on file    Years of education: Not on file    Highest education level: Not on file   Occupational History    Not on file   Social Needs    Financial resource strain: Not on file    Food insecurity Worry: Not on file     Inability: Not on file    Transportation needs     Medical: Not on file     Non-medical: Not on file   Tobacco Use    Smoking status: Former Smoker     Packs/day: 1.00     Years: 8.00     Pack years: 8.00     Types: Cigarettes    Smokeless tobacco: Never Used   Substance and Sexual Activity    Alcohol use: Yes     Comment: Occasioanlly    Drug use: No    Sexual activity: Not on file   Lifestyle    Physical activity     Days per week: Not on file     Minutes per session: Not on file    Stress: Not on file   Relationships    Social connections     Talks on phone: Not on file     Gets together: Not on file     Attends Congregational service: Not on file     Active member of club or organization: Not on file     Attends meetings of clubs or organizations: Not on file     Relationship status: Not on file    Intimate partner violence     Fear of current or ex partner: Not on file     Emotionally abused: Not on file     Physically abused: Not on file     Forced sexual activity: Not on file   Other Topics Concern    Not on file   Social History Narrative    Not on file       Current Facility-Administered Medications   Medication Dose Route Frequency Provider Last Rate Last Admin    nitroGLYCERIN (NITROSTAT) SL tablet 0.4 mg  0.4 mg Sublingual Q5 Min PRN BERNIE Infante   0.4 mg at 02/26/21 0920    nitroGLYCERIN 50 mg in dextrose 5% 250 mL infusion  5-200 mcg/min Intravenous Continuous BERNIE Infante        [START ON 2/27/2021] aspirin EC tablet 81 mg  81 mg Oral Daily Mike Corley MD        clonazePAM (KLONOPIN) tablet 0.5 mg  0.5 mg Oral Nightly PRN Mike Corley MD        rosuvastatin (CRESTOR) tablet 10 mg  10 mg Oral Nightly Mike Corley MD        sodium chloride flush 0.9 % injection 10 mL  10 mL Intravenous 2 times per day Mike Corley MD        sodium chloride flush 0.9 % injection 10 mL  10 mL Intravenous PRN Mike Corley MD        promethazine (PHENERGAN) tablet 12.5 mg  12.5 mg Oral Q6H PRN Debbie Aldana MD        Or    ondansetron (ZOFRAN) injection 4 mg  4 mg Intravenous Q6H PRN Debbie Aldana MD        acetaminophen (TYLENOL) tablet 650 mg  650 mg Oral Q6H PRN Debbie Aldana MD        Or    acetaminophen (TYLENOL) suppository 650 mg  650 mg Rectal Q6H PRN Debbie Aldana MD        polyethylene glycol (GLYCOLAX) packet 17 g  17 g Oral Daily PRN Debbie Aldana MD        regadenoson (LEXISCAN) injection 0.4 mg  0.4 mg Intravenous ONCE PRN Debbie Aldana MD        enoxaparin (LOVENOX) injection 80 mg  1 mg/kg Subcutaneous Q12H Debbie Aldana MD   80 mg at 02/26/21 1630    metoprolol tartrate (LOPRESSOR) tablet 12.5 mg  12.5 mg Oral BID Debbie Aldana MD        glucose (GLUTOSE) 40 % oral gel 15 g  15 g Oral PRN Debbie Aldana MD        dextrose 50 % IV solution  12.5 g Intravenous PRN Debbie Aldana MD        glucagon (rDNA) injection 1 mg  1 mg Intramuscular PRN Debbie Aldana MD        dextrose 5 % solution  100 mL/hr Intravenous PRN Debbie Aldana MD        insulin lispro (HUMALOG) injection vial 0-6 Units  0-6 Units Subcutaneous TID  Debbie Aldana MD   2 Units at 02/26/21 1606    insulin lispro (HUMALOG) injection vial 0-3 Units  0-3 Units Subcutaneous Nightly Debbie Aldana MD        metFORMIN (GLUCOPHAGE) tablet 1,000 mg  1,000 mg Oral BID  Debbie Aldana MD   1,000 mg at 02/26/21 1605       Review of Systems -     General ROS: negative  Psychological ROS: negative  Hematological and Lymphatic ROS: No history of blood clots or bleeding disorder. Respiratory ROS: no cough,  or wheezing, the rest see HPI  Cardiovascular ROS: See HPI  Gastrointestinal ROS: negative  Genito-Urinary ROS: no dysuria, trouble voiding, or hematuria  Musculoskeletal ROS: negative  Neurological ROS: no TIA or stroke symptoms  Dermatological ROS: negative      Blood pressure (!) 141/79, pulse 80, temperature 97.7 °F (36.5 °C), temperature source Oral, resp.  rate 16, height 5' 7\" (1.702 m), weight 172 lb 14.4 oz (78.4 kg), SpO2 97 %. Physical Examination:    General appearance - alert, well appearing, and in no distress  HEENT- Pink conjunctiva  , Non-icteri sclera,PERRLA  Mental status - alert, oriented to person, place, and time  Neck - supple, no significant adenopathy, no JVD, or carotid bruits  Chest - clear to auscultation, no wheezes, rales or rhonchi, symmetric air entry  Heart - normal rate, regular rhythm, normal S1, S2, no murmurs, rubs, clicks or gallops  Abdomen - soft, nontender, nondistended, no masses or organomegaly  ISAURO- no CVA or flank tenderness, no suprapubic tenderness  Neurological - alert, oriented, normal speech, no focal findings or movement disorder noted  Musculoskeletal/limbs - no joint tenderness, deformity or swelling   - peripheral pulses normal, no pedal edema, no clubbing or cyanosis  Skin - normal coloration and turgor, no rashes, no suspicious skin lesions noted  Psych- appropriate mood and affect    Lab  No results for input(s): CKTOTAL, CKMB, CKMBINDEX, TROPONINI in the last 72 hours.   CBC:   Lab Results   Component Value Date    WBC 6.6 02/26/2021    RBC 5.84 02/26/2021    HGB 15.0 02/26/2021    HCT 47.4 02/26/2021    MCV 81.2 02/26/2021    MCH 25.7 02/26/2021    MCHC 31.6 02/26/2021    RDW 14.4 02/13/2018     02/26/2021    MPV 9.7 02/26/2021     BMP:    Lab Results   Component Value Date     02/26/2021    K 4.0 02/26/2021     02/26/2021    CO2 27 02/26/2021    BUN 20 02/26/2021    LABALBU 4.1 09/12/2017    CREATININE 0.8 02/26/2021    CALCIUM 10.0 02/26/2021    LABGLOM >90 02/26/2021    GLUCOSE 191 02/26/2021     Hepatic Function Panel:    Lab Results   Component Value Date    ALKPHOS 63 09/12/2017    ALT 23 09/12/2017    AST 17 09/12/2017    PROT 7.1 09/12/2017    BILITOT 0.2 09/12/2017    LABALBU 4.1 09/12/2017     Magnesium:    Lab Results   Component Value Date    MG 1.9 02/26/2021     Warfarin PT/INR:  No components found for: Jeffrey Reed  HgBA1c:    Lab Results   Component Value Date    LABA1C 8.4 02/26/2021     FLP:    Lab Results   Component Value Date    TRIG 167 02/26/2021    HDL 37 02/26/2021    LDLCALC 120 02/26/2021     TSH:    Lab Results   Component Value Date    TSH 1.750 09/12/2017       No pulmonary emboli are seen. Mildly aneurysmal ascending thoracic aorta. 2. Fibroemphysematous lungs. Findings of pulmonary arterial hypertension.                   This report has been created using voice recognition software.  It may contain minor errors which are inherent in voice recognition technology.           Final report electronically signed by Dr. Dory Favre on 2/26/2021 11:36 AM         Conclusions      Summary   Lexiscan EKG stress test is not suggestive for ischemia. The nuclear images is suggestive for mild myocardial ischemia in the   inferolateral region. Attenuation artifact related abnormality can not be excluded with   certainty. Recommendation   Clinical correlation is recommended. Signatures      ----------------------------------------------------------------   Electronically signed by Megan Mckay MD (Interpreting   Cardiologist) on 02/26/2021 at 17:13      ekg 2/26/21  Sinus rhythm with Premature atrial complexes in a pattern of bigeminy  Left anterior fascicular block  Abnormal ECG  When compared with ECG of 14-FEB-2018 10:33,  Premature atrial complexes are now Present        Assessment    1.  chest pain- respond to NTG SL  And previous hx of chest pain   2. Mildly Abn nuc stress test with inferolateral ischemia  3. HLD  4. DM 2  5. CT chest appearance of emphysema(asymptomatic) and pulm HTN   6.  4 cm ascending aorta aneurysm.     Plan     D/w the pat the need for cardiac cath and risk and benefit well explained   Pat at this time want to continue with medical therapy  And he think over it and make a decision down the line    Pat verbalized understanding the risk of MI and death    Start toprol xl 25 mg po qhs    Cont asa, crestor    Need script for NTG -SL on D/c  To be used as needed    F/u cardiology and pcp as out pat    Based on the course and the result of the above test we will gauge further care    Thank you for allowing me to participate in the care of your patient.  Please don't hesitate to contact me regarding any further issues related to the patient care      Sonya Marie MD

## 2021-02-26 NOTE — PROGRESS NOTES
Pt admitted to  6K5 from ED. Complaints: Chest pain / discomfort. IV site free of s/s of infection or infiltration. Vital signs obtained. Assessment and data collection initiated. Two nurse skin assessment performed by Rosalind KRISHNA and Judith Harris. Oriented to room. Policies and procedures for  explained. Rosalind KRISHNA discussed hourly rounding with patient addressing 5 P's. Fall prevention and safety brochure discussed with patient. Bed alarm on. Call light in reach. The best day to schedule a follow up Dr appointment is:  Tuesday a.m.

## 2021-02-27 VITALS
TEMPERATURE: 98.1 F | WEIGHT: 172.9 LBS | BODY MASS INDEX: 27.14 KG/M2 | HEART RATE: 68 BPM | HEIGHT: 67 IN | RESPIRATION RATE: 18 BRPM | SYSTOLIC BLOOD PRESSURE: 111 MMHG | OXYGEN SATURATION: 96 % | DIASTOLIC BLOOD PRESSURE: 71 MMHG

## 2021-02-27 PROBLEM — I20.0 UNSTABLE ANGINA (HCC): Status: ACTIVE | Noted: 2021-02-27

## 2021-02-27 PROBLEM — E78.00 PURE HYPERCHOLESTEROLEMIA: Status: ACTIVE | Noted: 2021-02-27

## 2021-02-27 LAB
ERYTHROCYTE [DISTWIDTH] IN BLOOD BY AUTOMATED COUNT: 13.3 % (ref 11.5–14.5)
ERYTHROCYTE [DISTWIDTH] IN BLOOD BY AUTOMATED COUNT: 39.6 FL (ref 35–45)
GLUCOSE BLD-MCNC: 131 MG/DL (ref 70–108)
GLUCOSE BLD-MCNC: 156 MG/DL (ref 70–108)
GLUCOSE BLD-MCNC: 217 MG/DL (ref 70–108)
HCT VFR BLD CALC: 43.3 % (ref 42–52)
HEMOGLOBIN: 13.7 GM/DL (ref 14–18)
MCH RBC QN AUTO: 26.2 PG (ref 26–33)
MCHC RBC AUTO-ENTMCNC: 31.6 GM/DL (ref 32.2–35.5)
MCV RBC AUTO: 83 FL (ref 80–94)
PLATELET # BLD: 171 THOU/MM3 (ref 130–400)
PMV BLD AUTO: 10.1 FL (ref 9.4–12.4)
RBC # BLD: 5.22 MILL/MM3 (ref 4.7–6.1)
WBC # BLD: 5.4 THOU/MM3 (ref 4.8–10.8)

## 2021-02-27 PROCEDURE — 82948 REAGENT STRIP/BLOOD GLUCOSE: CPT

## 2021-02-27 PROCEDURE — 6370000000 HC RX 637 (ALT 250 FOR IP): Performed by: INTERNAL MEDICINE

## 2021-02-27 PROCEDURE — 36415 COLL VENOUS BLD VENIPUNCTURE: CPT

## 2021-02-27 PROCEDURE — 93005 ELECTROCARDIOGRAM TRACING: CPT | Performed by: INTERNAL MEDICINE

## 2021-02-27 PROCEDURE — 85027 COMPLETE CBC AUTOMATED: CPT

## 2021-02-27 PROCEDURE — 2580000003 HC RX 258: Performed by: INTERNAL MEDICINE

## 2021-02-27 RX ORDER — BLOOD SUGAR DIAGNOSTIC
1 STRIP MISCELLANEOUS 2 TIMES DAILY
Qty: 100 EACH | Refills: 3 | Status: SHIPPED | OUTPATIENT
Start: 2021-02-27

## 2021-02-27 RX ORDER — NITROGLYCERIN 0.4 MG/1
TABLET SUBLINGUAL
Qty: 25 TABLET | Refills: 3 | Status: SHIPPED | OUTPATIENT
Start: 2021-02-27 | End: 2021-02-27

## 2021-02-27 RX ORDER — METOPROLOL SUCCINATE 25 MG/1
25 TABLET, EXTENDED RELEASE ORAL DAILY
Qty: 30 TABLET | Refills: 3 | Status: SHIPPED | OUTPATIENT
Start: 2021-02-27

## 2021-02-27 RX ORDER — ROSUVASTATIN CALCIUM 10 MG/1
10 TABLET, COATED ORAL DAILY
Qty: 90 TABLET | Refills: 3 | Status: SHIPPED | OUTPATIENT
Start: 2021-02-27

## 2021-02-27 RX ORDER — METOPROLOL SUCCINATE 25 MG/1
25 TABLET, EXTENDED RELEASE ORAL DAILY
Qty: 30 TABLET | Refills: 3 | Status: SHIPPED | OUTPATIENT
Start: 2021-02-27 | End: 2021-02-27

## 2021-02-27 RX ORDER — ROSUVASTATIN CALCIUM 10 MG/1
10 TABLET, COATED ORAL DAILY
Qty: 90 TABLET | Refills: 3 | Status: SHIPPED | OUTPATIENT
Start: 2021-02-27 | End: 2021-02-27 | Stop reason: SDUPTHER

## 2021-02-27 RX ORDER — NITROGLYCERIN 0.4 MG/1
TABLET SUBLINGUAL
Qty: 25 TABLET | Refills: 3 | Status: SHIPPED | OUTPATIENT
Start: 2021-02-27

## 2021-02-27 RX ADMIN — ASPIRIN 81 MG: 81 TABLET, COATED ORAL at 08:54

## 2021-02-27 RX ADMIN — SODIUM CHLORIDE, PRESERVATIVE FREE 10 ML: 5 INJECTION INTRAVENOUS at 09:13

## 2021-02-27 NOTE — DISCHARGE SUMMARY
Discharge Summary    Ralph Ramos  :  1950  MRN:  [id number]    Admit date:  2021  Discharge date:      Admitting Physician:  Sammy Kearns MD    Discharge Diagnoses:    1. Chest pain / Unstable angina  2. HLD  3. DM 2  4. CT chest appearance of emphysema(asymptomatic) and pulm HTN      Patient Active Problem List   Diagnosis    Chest pain    Other chest pain    Uncontrolled type 2 diabetes mellitus with ketoacidosis without coma, without long-term current use of insulin ([facility] [address]    Essential hypertension    Thoracic aortic aneurysm without rupture ([facility] [address]    Unstable angina ([facility] [address]    Pure hypercholesterolemia       Admission Condition:  serious  Discharged Condition:  good    Hospital Course:   Patient was evaluated with negative serial troponins, had a positive cardiac stress test but deferred on LHC. Will cont on medical Rx and f/up with cardiology as OP. Discharge Medications:       Stephanie [address] Medication Instructions [id number]    Printed on:21 1342   Medication Information                      aspirin 81 MG tablet  Take 1 tablet by mouth daily             cetirizine (ZYRTEC) 10 MG tablet  Take 10 mg by mouth as needed              clonazePAM (KLONOPIN) 0.5 MG tablet  Take 0.5 mg by mouth nightly as needed. docusate sodium (COLACE) 100 MG capsule  Take 100 mg by mouth daily as needed for Constipation             metFORMIN (GLUCOPHAGE) 1000 MG tablet  Take 1 tablet by mouth 2 times daily (with meals)             metoprolol succinate (TOPROL XL) 25 MG extended release tablet  Take 1 tablet by mouth daily             nitroGLYCERIN (NITROSTAT) 0.4 MG SL tablet  up to max of 3 total doses. If no relief after 1 dose, call 911. rosuvastatin (CRESTOR) 10 MG tablet  Take 1 tablet by mouth daily             zolpidem (AMBIEN) 10 MG tablet  Take 10 mg by mouth nightly as needed for Sleep.                  Consults:  cardiology    Significant Diagnostic Studies: with cardiology as OP   Sleep study as OP. Disposition:  Home.     Signed:  [place]  2/27/2021, 1:42 PM

## 2021-02-27 NOTE — PLAN OF CARE
Problem: Discharge Planning:  Goal: Participates in care planning  Description: Participates in care planning  Outcome: Ongoing  Note: From home with wife and plans on returning home with wife upon discharge. Goal: Discharged to appropriate level of care  Description: Discharged to appropriate level of care  Outcome: Ongoing     Problem: Nutrition Deficit:  Goal: Ability to achieve adequate nutritional intake will improve  Description: Ability to achieve adequate nutritional intake will improve  Outcome: Ongoing  Note: Diet is carb control, no caffeine. Problem: Pain:  Goal: Pain level will decrease  Description: Pain level will decrease  Outcome: Ongoing  Note: Pt denies being in any pain. Will continue to monitor. Goal: Recognizes and communicates pain  Description: Recognizes and communicates pain  Outcome: Ongoing  Goal: Control of acute pain  Description: Control of acute pain  Outcome: Ongoing  Goal: Control of chronic pain  Description: Control of chronic pain  Outcome: Ongoing     Problem: Serum Glucose Level - Abnormal:  Goal: Ability to maintain appropriate glucose levels will improve to within specified parameters  Description: Ability to maintain appropriate glucose levels will improve to within specified parameters  Outcome: Ongoing  Note: ACHS protocol in place. Hyperglycemic and hypoglycemic orders utilized when needed per [facility]. Will continue to monitor. Problem: Skin Integrity - Impaired:  Goal: Will show no infection signs and symptoms  Description: Will show no infection signs and symptoms  Outcome: Ongoing  Goal: Absence of new skin breakdown  Description: Absence of new skin breakdown  Outcome: Ongoing  Note: No skin break down noted at this time. Encouraged patient to reposition self in bed. Care plan reviewed with patient. Patient verbalizes understanding of plan of care and contributes to goal setting.

## 2021-02-27 NOTE — PROGRESS NOTES
INTERNAL MEDICINE Progress Note  2/27/2021 1:33 PM  Subjective:   Admit Date: 2/26/2021  PCP: Twan Zuniga MD  Interval History:  No new CP, no SOB    Objective:   Vitals: /71   Pulse 68   Temp 98.1 °F (36.7 °C) (Oral)   Resp 18   Ht 5' 7\" (1.702 m)   Wt 172 lb 14.4 oz (78.4 kg)   SpO2 96%   BMI 27.08 kg/m²   General appearance: alert and cooperative with exam  HEENT: Head: atraumatic  Neck: no adenopathy, no carotid bruit and no JVD  Lungs: clear to auscultation bilaterally  Heart: S1, S2 normal  Abdomen: soft, non-tender; bowel sounds normal; no masses,  no organomegaly  Extremities: no edema, redness or tenderness in the calves or thighs  Neurologic: Mental status: Alert, oriented, thought content appropriate      Medications:   Scheduled Meds:   aspirin  81 mg Oral Daily    rosuvastatin  10 mg Oral Nightly    sodium chloride flush  10 mL Intravenous 2 times per day    enoxaparin  1 mg/kg Subcutaneous Q12H    insulin lispro  0-6 Units Subcutaneous TID WC    insulin lispro  0-3 Units Subcutaneous Nightly    metFORMIN  1,000 mg Oral BID WC    metoprolol succinate  25 mg Oral Daily     Continuous Infusions:   nitroGLYCERIN      dextrose         Lab Results:   CBC:   Recent Labs     02/26/21  0901 02/27/21  0508   WBC 6.6 5.4   HGB 15.0 13.7*    171     BMP:    Recent Labs     02/26/21  0901      K 4.0      CO2 27   BUN 20   CREATININE 0.8   GLUCOSE 191*     Lipids:   Recent Labs     02/26/21  1248   CHOL 190   HDL 37     Magnesium:    Lab Results   Component Value Date    MG 1.9 02/26/2021     HgBA1c:    Lab Results   Component Value Date    LABA1C 8.4 02/26/2021     echo  Normal left ventricle size and systolic function. Ejection fraction was    estimated at 60 %.  There were no regional left ventricular wall motion    abnormalities and wall thickness was within normal limits.    Doppler parameters were consistent with abnormal left ventricular    relaxation (grade 1 diastolic dysfunction).      Signature  ----------------------------------------------------------------    Electronically signed by Vasyl Knight MD (Interpreting    [email or web address]) on 02/26/2021 at 09:30 PM    ----------------------------------------------------------------       Cardiac Stress Test- [facility]  Summary    Lexiscan EKG stress test is not suggestive for ischemia.    The nuclear images is suggestive for mild myocardial ischemia in the    inferolateral region.    Attenuation artifact related abnormality can not be excluded with    certainty.        Recommendation    Clinical correlation is recommended.      Signatures     ----------------------------------------------------------------    Electronically signed by Vasyl Knight MD (Interpreting    Cardiologist) on 02/26/2021 at 17:13    ----------------------------------------------------------------  Assessment and Plan:   1. Chest pain / Unstable angina  2. HLD  3. DM 2  4. CT chest appearance of emphysema(asymptomatic) and pulm HTN       Stress test noted, cardiologists evaluated-patient deferred on [facility], will keep on meds  ASA, BB, statin,  Increase metformin BID 1000 mg  F/up with cardiology as OP   Sleep study as OP.     Rosa Romero MD

## 2021-02-27 NOTE — PROGRESS NOTES
Discharge teaching and instructions for diagnosis/procedure of Chest pain completed with patient using teachback method. AVS reviewed. Prescriptions sent to pharmacy. Patient voiced understanding regarding prescriptions, follow up appointments, and care of self at home.  Discharged ambulatory to  home with support per self

## 2021-02-28 LAB
EKG ATRIAL RATE: 66 BPM
EKG P AXIS: 43 DEGREES
EKG P-R INTERVAL: 196 MS
EKG Q-T INTERVAL: 364 MS
EKG QRS DURATION: 84 MS
EKG QTC CALCULATION (BAZETT): 381 MS
EKG R AXIS: -55 DEGREES
EKG T AXIS: 13 DEGREES
EKG VENTRICULAR RATE: 66 BPM

## 2021-02-28 PROCEDURE — 93010 ELECTROCARDIOGRAM REPORT: CPT | Performed by: NUCLEAR MEDICINE

## 2021-03-02 ENCOUNTER — CARE COORDINATION (OUTPATIENT)
Dept: CASE MANAGEMENT | Age: 71
End: 2021-03-02

## 2021-03-02 ENCOUNTER — TELEPHONE (OUTPATIENT)
Dept: CARDIOLOGY CLINIC | Age: 71
End: 2021-03-02

## 2021-03-02 NOTE — CARE COORDINATION
[facility] Transitions Initial Follow Up Call    Call within 2 business days of discharge: Yes    Patient: Aime [address]. Patient : 1950   MRN: [id number]  Reason for Admission: Chest Pain; Shortness of Breath  Discharge Date: 21 RARS: Readmission Risk Score: 11      Last Discharge [facility]       Complaint Diagnosis Description Type Department Provider    21 Chest Pain; Shortness of Breath Chest pain, unspecified type . .. ED to Hosp-Admission (Discharged) (ADMITTED) ISAI Silverman MD        1st & only Attempt to contact patient for 24 Hour Transition Call - (Discharged from Hospital to Home). Non [facility] PCP  Patient was not reached. Left HIPAA Compliant message on voice mail for Patient that this is the Final Transition Call and to call their Specialist/PCP for any problems or issues that may occur.      FINAL CALL    Evie Becerra LPN    [phone]  [facility] Transitions 24 Hour Call    Care Transitions Interventions         Follow Up  Future Appointments   Date Time Provider Angela Mata   3/10/2021 12:00 PM Soledad Aguilar MD [facility] [email or web address]   3/19/2021  9:00 AM Oscar Barlow  [facility] [place]       Evie Becerra LPN

## 2021-03-02 NOTE — TELEPHONE ENCOUNTER
[facility] hospital called over the weekend left msg afterhours patient needs an appt with Dr. Landa Burn in 1 week was d/c 2/26. Please contact patient to schedule.

## 2021-03-10 ENCOUNTER — VIRTUAL VISIT (OUTPATIENT)
Dept: CARDIOLOGY CLINIC | Age: 71
End: 2021-03-10
Payer: COMMERCIAL

## 2021-03-10 DIAGNOSIS — R07.9 CHEST PAIN, UNSPECIFIED TYPE: Primary | ICD-10-CM

## 2021-03-10 PROCEDURE — 99213 OFFICE O/P EST LOW 20 MIN: CPT | Performed by: INTERNAL MEDICINE

## 2021-03-10 NOTE — PROGRESS NOTES
[address] [address] [facility] [address]  Dept: [phone]  Dept Fax: [phone]  Loc: [phone]    TELEHEALTH EVALUATION -- Audio/Visual (During WSY-89 public health emergency)    Visit Date: 3/10/2021    Marlon Tobar Dr. is a 72 y.o. male who is seen via Virtual Video Doxy visit. Marlon Tobar Dr. was at home. Provider was present at Cardiology clinic. Cardiology staff assisted. No chief complaint on file. 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 He was in the hospital on 2/26/21 for chest pain. Serial enzymes were negative ruling out ACS. Underwent stress test which showed mild inferolateral abnormality. He opted for conservative medical management. Today he states he is doing well since discharge, no issues. Mild fatigue with newly started metoprolol. Current Outpatient Medications:     metFORMIN (GLUCOPHAGE) 1000 MG tablet, Take 1 tablet by mouth 2 times daily (with meals), Disp: 180 tablet, Rfl: 3    metoprolol succinate (TOPROL XL) 25 MG extended release tablet, Take 1 tablet by mouth daily, Disp: 30 tablet, Rfl: 3    nitroGLYCERIN (NITROSTAT) 0.4 MG SL tablet, up to max of 3 total doses. If no relief after 1 dose, call 911., Disp: 25 tablet, Rfl: 3    rosuvastatin (CRESTOR) 10 MG tablet, Take 1 tablet by mouth daily, Disp: 90 tablet, Rfl: 3    blood glucose test strips (COOL BLOOD GLUCOSE TEST STRIPS) strip, 1 each by In Vitro route 2 times daily As needed. , Disp: 100 each, Rfl: 3    docusate sodium (COLACE) 100 MG capsule, Take 100 mg by mouth daily as needed for Constipation, Disp: , Rfl:     aspirin 81 MG tablet, Take 1 tablet by mouth daily, Disp: 1 tablet, Rfl: 0    zolpidem (AMBIEN) 10 MG tablet, Take 10 mg by mouth nightly as needed for Sleep., Disp: , Rfl:     clonazePAM (KLONOPIN) 0.5 MG tablet, Take 0.5 mg by mouth nightly as needed. , Disp: , Rfl:     cetirizine (ZYRTEC) 10 MG tablet, Take 10 mg by mouth as needed , Disp: , Rfl:     Past Medical History  Anne-Marie Casarez  has a past medical history of Diabetes mellitus ([facility] [address] Social History  Anne-Marie Casarez  reports that he has quit smoking. His smoking use included cigarettes. He has a 8.00 pack-year smoking history. He has never used smokeless tobacco. He reports current alcohol use. He reports that he does not use drugs. Family History  Terrell family history is not on file. Past Surgical History   Past Surgical History:   Procedure Laterality Date    COLONOSCOPY N/A 6/15/2018    COLONOSCOPY POLYPECTOMY SNARE/COLD BIOPSY performed by Paolo Moreon MD at [place] [address]      right inguinal hernia repair       Subjective:     REVIEW OF SYSTEMS  Constitutional: denies sweats, chills and fever  HENT: denies  congestion, sinus pressure, sneezing and sore throat. Eyes: denies  pain, discharge, redness and itching. Respiratory: denies apnea, cough  Gastrointestinal: denies blood in stool, constipation, diarrhea   Endocrine: denies cold intolerance, heat intolerance, polydipsia. Genitourinary: denies dysuria, enuresis, flank pain and hematuria. Musculoskeletal: denies arthralgias, joint swelling and neck pain. Neurological: denies numbness and headaches. Psychiatric/Behavioral: denies agitation, confusion, decreased concentration and dysphoric mood    All others reviewed and are negative.    Objective:     Temp - 98F, SpO2 -96-97%, HR 80  /79 - self-reported     Physical Exam   Constitutional: [x] Appears well-developed and well-nourished [x] No apparent distress                            [] Abnormal-   Mental status  [x] Alert and awake  [x] Oriented to person/place/time [x]Able to follow commands       Eyes:  EOM    [x]  Normal  [] Abnormal-  Sclera  [x]  Normal  [] Abnormal -         Discharge [x]  None visible  [] Component Value Date    TSH 1.750 09/12/2017         Testing Reviewed:      I haveindividually reviewed the below cardiac tests    EKG:    ECHO:   Results for orders placed during the hospital encounter of 02/26/21   Echo Complete    Narrative Transthoracic Echocardiography Report (TTE)     Demographics      Patient Name      Aisha Cunningham  Gender                  Male      MR #              [id number]    Race                    Other                                     Ethnicity      Account #         [de-identified]    Room Number             0005      Accession Number  [id number]   Date of Study           02/26/2021      Date of Birth     1950   Referring Physician     Sandra Cummings MD      Age               72 year(s)   Chemo Guzman [facility]                                     Interpreting Physician  Tory Cerrato MD     Procedure    Type of Study      TTE procedure:ECHOCARDIOGRAM COMPLETE 2D W DOPPLER W COLOR. Procedure Date  Date: 02/26/2021 Start: 02:00 PM    Study Location: Echo Lab  Technical Quality: Adequate visualization    Indications:Chest pain and Unstable angina. Additional Medical History:Thoracic aortic aneurysm, Palpitations, Diabetes,  Hypertension, Former smoker    Patient Status: Routine    Height: 66.93 inches Weight: 172 pounds BSA: 1.9 m^2 BMI: 27 kg/m^2    BP: 124/81 mmHg     Conclusions      Summary   Normal left ventricle size and systolic function. Ejection fraction was   estimated at 60 %. There were no regional left ventricular wall motion   abnormalities and wall thickness was within normal limits. Doppler parameters were consistent with abnormal left ventricular   relaxation (grade 1 diastolic dysfunction).       Signature      ----------------------------------------------------------------   Electronically signed by Tory Cerrato MD (Interpreting   physician) on 02/26/2021 at 09:30 PM   ---------------------------------------------------------------- Findings      Mitral Valve   The mitral valve structure was normal with normal leaflet separation. DOPPLER: The transmitral velocity was within the normal range with no   evidence for mitral stenosis. Mild mitral regurgitation is present. Aortic Valve   The aortic valve was trileaflet with normal thickness and cuspal   separation. DOPPLER: Transaortic velocity was within the normal range with   no evidence of aortic stenosis. Mild aortic regurgitation is noted. Tricuspid Valve   The tricuspid valve structure was normal with normal leaflet separation. DOPPLER: There was no evidence of tricuspid stenosis. Mild tricuspid regurgitation visualized. Pulmonic Valve   The pulmonic valve leaflets exhibited normal thickness, no calcification,   and normal cuspal separation. DOPPLER: The transpulmonic velocity was   within the normal range with no evidence for regurgitation. Left Atrium   Left atrial size was normal.      Left Ventricle   Normal left ventricle size and systolic function. Ejection fraction was   estimated at 60 %. There were no regional left ventricular wall motion   abnormalities and wall thickness was within normal limits. Doppler parameters were consistent with abnormal left ventricular   relaxation (grade 1 diastolic dysfunction). Right Atrium   Right atrial size was normal.      Right Ventricle   The right ventricular size was normal with normal systolic function and   wall thickness. Pericardial Effusion   The pericardium was normal in appearance with no evidence of a pericardial   effusion. Pleural Effusion   No evidence of pleural effusion. Aorta / Great Vessels   -Aortic root dimension within normal limits.   -The Pulmonary artery is within normal limits. -IVC size is within normal limits with normal respiratory phasic changes.      M-Mode/2D Measurements & Calculations      LV Diastolic   LV Systolic Dimension:    AV Cusp Separation: 2.1 cmLA Dimension: 3.4 2.1 cm                    Dimension: 2.9 cmAO Root   cm             LV Volume Diastolic: 30.0 Dimension: 3.6 cmLA Area: 12.3   LV FS:38.2 %   ml                        cm^2   LV PW          LV Volume Systolic: 11.7   Diastolic: 0.9 ml   cm             LV EDV/LV EDV Index: 47.4   Septum         ml/25 m^2LV ESV/LV ESV    RV Diastolic Dimension: 2.2 cm   Diastolic: 1   Index: 65.1 ml/8 m^2   cm             EF Calculated: 69.6 %     LA/Aorta: 0.81                                               LA volume/Index: 28.2 ml /15m^2     Doppler Measurements & Calculations      MV Peak E-Wave: 50.6 cm/s  AV Peak Velocity: 151  LVOT Peak Velocity: 101   MV Peak A-Wave: 87.8 cm/s  cm/s                   cm/s   MV E/A Ratio: 0.58         AV Peak Gradient: 9.12 LVOT Peak Gradient: 4   MV Peak Gradient: 1.02     mmHg                   mmHg   mmHg                                                     TV Peak E-Wave: 38.1   MV Deceleration Time: 387                         cm/s   msec                                              TV Peak A-Wave: 54.4   MV P1/2t: 113 msec         AV P1/2t: 787 msec     cm/s   MVA by PHT:1.95 cm^2       IVRT: 85 msec                                                     TV Peak Gradient: 0.58   MV E' Septal Velocity: 7.1                        mmHg   cm/s                       AV DVI (Vmax):0.67     TR Velocity:239 cm/s   MV A' Septal Velocity: 8.8                        TR Gradient:22.85 mmHg   cm/s                                              PV Peak Velocity: 69.8   MV E' Lateral Velocity:                           cm/s   7.7 cm/s                                          PV Peak Gradient: 1.95   MV A' Lateral Velocity:                           mmHg   9.7 cm/s   E/E' septal: 7.13   E/E' lateral: 6.57     [email or web address] [id number]       STRESS:    CATH:    Assessment/Plan

## 2022-03-17 ENCOUNTER — TELEPHONE (OUTPATIENT)
Dept: CARDIOLOGY CLINIC | Age: 72
End: 2022-03-17

## 2022-08-22 ENCOUNTER — APPOINTMENT (OUTPATIENT)
Dept: CT IMAGING | Age: 72
End: 2022-08-22
Payer: COMMERCIAL

## 2022-08-22 ENCOUNTER — APPOINTMENT (OUTPATIENT)
Dept: MRI IMAGING | Age: 72
End: 2022-08-22
Payer: COMMERCIAL

## 2022-08-22 ENCOUNTER — APPOINTMENT (OUTPATIENT)
Dept: GENERAL RADIOLOGY | Age: 72
End: 2022-08-22
Payer: COMMERCIAL

## 2022-08-22 ENCOUNTER — HOSPITAL ENCOUNTER (EMERGENCY)
Age: 72
Discharge: HOME | End: 2022-08-22
Attending: STUDENT IN AN ORGANIZED HEALTH CARE EDUCATION/TRAINING PROGRAM
Payer: COMMERCIAL

## 2022-08-22 VITALS
BODY MASS INDEX: 26.68 KG/M2 | WEIGHT: 170 LBS | HEART RATE: 73 BPM | HEIGHT: 67 IN | SYSTOLIC BLOOD PRESSURE: 156 MMHG | OXYGEN SATURATION: 97 % | DIASTOLIC BLOOD PRESSURE: 76 MMHG | RESPIRATION RATE: 18 BRPM | TEMPERATURE: 97.4 F

## 2022-08-22 DIAGNOSIS — H65.02 ACUTE SEROUS OTITIS MEDIA OF LEFT EAR, RECURRENCE NOT SPECIFIED: ICD-10-CM

## 2022-08-22 DIAGNOSIS — R42 VERTIGO: Primary | ICD-10-CM

## 2022-08-22 LAB
ANION GAP SERPL CALCULATED.3IONS-SCNC: 11 MEQ/L (ref 8–16)
AVERAGE GLUCOSE: 192 MG/DL (ref 70–126)
BACTERIA: ABNORMAL /HPF
BASOPHILS # BLD: 0.6 %
BASOPHILS ABSOLUTE: 0 THOU/MM3 (ref 0–0.1)
BILIRUBIN URINE: NEGATIVE
BLOOD, URINE: NEGATIVE
BUN BLDV-MCNC: 19 MG/DL (ref 7–22)
CALCIUM SERPL-MCNC: 9.3 MG/DL (ref 8.5–10.5)
CASTS 2: ABNORMAL /LPF
CASTS UA: ABNORMAL /LPF
CHARACTER, URINE: CLEAR
CHLORIDE BLD-SCNC: 105 MEQ/L (ref 98–111)
CO2: 24 MEQ/L (ref 23–33)
COLOR: YELLOW
CREAT SERPL-MCNC: 0.9 MG/DL (ref 0.4–1.2)
CRYSTALS, UA: ABNORMAL
EKG ATRIAL RATE: 65 BPM
EKG P AXIS: 47 DEGREES
EKG P-R INTERVAL: 172 MS
EKG Q-T INTERVAL: 422 MS
EKG QRS DURATION: 78 MS
EKG QTC CALCULATION (BAZETT): 438 MS
EKG R AXIS: -44 DEGREES
EKG T AXIS: 36 DEGREES
EKG VENTRICULAR RATE: 65 BPM
EOSINOPHIL # BLD: 0.3 %
EOSINOPHILS ABSOLUTE: 0 THOU/MM3 (ref 0–0.4)
EPITHELIAL CELLS, UA: ABNORMAL /HPF
ERYTHROCYTE [DISTWIDTH] IN BLOOD BY AUTOMATED COUNT: 14 % (ref 11.5–14.5)
ERYTHROCYTE [DISTWIDTH] IN BLOOD BY AUTOMATED COUNT: 42.8 FL (ref 35–45)
GFR SERPL CREATININE-BSD FRML MDRD: 83 ML/MIN/1.73M2
GLUCOSE BLD-MCNC: 210 MG/DL (ref 70–108)
GLUCOSE BLD-MCNC: 220 MG/DL (ref 70–108)
GLUCOSE URINE: 100 MG/DL
HBA1C MFR BLD: 8.4 % (ref 4.4–6.4)
HCT VFR BLD CALC: 45.7 % (ref 42–52)
HEMOGLOBIN: 13.6 GM/DL (ref 14–18)
IMMATURE GRANS (ABS): 0.03 THOU/MM3 (ref 0–0.07)
IMMATURE GRANULOCYTES: 0.4 %
KETONES, URINE: 40
LEUKOCYTE ESTERASE, URINE: NEGATIVE
LYMPHOCYTES # BLD: 17.6 %
LYMPHOCYTES ABSOLUTE: 1.2 THOU/MM3 (ref 1–4.8)
MCH RBC QN AUTO: 25.3 PG (ref 26–33)
MCHC RBC AUTO-ENTMCNC: 29.8 GM/DL (ref 32.2–35.5)
MCV RBC AUTO: 85.1 FL (ref 80–94)
MISCELLANEOUS 2: ABNORMAL
MONOCYTES # BLD: 3.7 %
MONOCYTES ABSOLUTE: 0.3 THOU/MM3 (ref 0.4–1.3)
NITRITE, URINE: NEGATIVE
NUCLEATED RED BLOOD CELLS: 0 /100 WBC
OSMOLALITY CALCULATION: 288.4 MOSMOL/KG (ref 275–300)
PH UA: 7 (ref 5–9)
PLATELET # BLD: 153 THOU/MM3 (ref 130–400)
PMV BLD AUTO: 10.9 FL (ref 9.4–12.4)
POTASSIUM SERPL-SCNC: 4.3 MEQ/L (ref 3.5–5.2)
PROTEIN UA: ABNORMAL
RBC # BLD: 5.37 MILL/MM3 (ref 4.7–6.1)
RBC URINE: ABNORMAL /HPF
RENAL EPITHELIAL, UA: ABNORMAL
SEG NEUTROPHILS: 77.4 %
SEGMENTED NEUTROPHILS ABSOLUTE COUNT: 5.4 THOU/MM3 (ref 1.8–7.7)
SODIUM BLD-SCNC: 140 MEQ/L (ref 135–145)
SPECIFIC GRAVITY, URINE: > 1.03 (ref 1–1.03)
TROPONIN T: < 0.01 NG/ML
UROBILINOGEN, URINE: 1 EU/DL (ref 0–1)
WBC # BLD: 7 THOU/MM3 (ref 4.8–10.8)
WBC UA: ABNORMAL /HPF
YEAST: ABNORMAL

## 2022-08-22 PROCEDURE — 81001 URINALYSIS AUTO W/SCOPE: CPT

## 2022-08-22 PROCEDURE — 93005 ELECTROCARDIOGRAM TRACING: CPT | Performed by: STUDENT IN AN ORGANIZED HEALTH CARE EDUCATION/TRAINING PROGRAM

## 2022-08-22 PROCEDURE — 6370000000 HC RX 637 (ALT 250 FOR IP): Performed by: NURSE PRACTITIONER

## 2022-08-22 PROCEDURE — 80048 BASIC METABOLIC PNL TOTAL CA: CPT

## 2022-08-22 PROCEDURE — 71046 X-RAY EXAM CHEST 2 VIEWS: CPT

## 2022-08-22 PROCEDURE — 83036 HEMOGLOBIN GLYCOSYLATED A1C: CPT

## 2022-08-22 PROCEDURE — 85025 COMPLETE CBC W/AUTO DIFF WBC: CPT

## 2022-08-22 PROCEDURE — 84484 ASSAY OF TROPONIN QUANT: CPT

## 2022-08-22 PROCEDURE — 36415 COLL VENOUS BLD VENIPUNCTURE: CPT

## 2022-08-22 PROCEDURE — 96374 THER/PROPH/DIAG INJ IV PUSH: CPT

## 2022-08-22 PROCEDURE — 99221 1ST HOSP IP/OBS SF/LOW 40: CPT

## 2022-08-22 PROCEDURE — 70496 CT ANGIOGRAPHY HEAD: CPT

## 2022-08-22 PROCEDURE — 99285 EMERGENCY DEPT VISIT HI MDM: CPT

## 2022-08-22 PROCEDURE — 6360000002 HC RX W HCPCS: Performed by: NURSE PRACTITIONER

## 2022-08-22 PROCEDURE — 70450 CT HEAD/BRAIN W/O DYE: CPT

## 2022-08-22 PROCEDURE — 93010 ELECTROCARDIOGRAM REPORT: CPT | Performed by: INTERNAL MEDICINE

## 2022-08-22 PROCEDURE — 2580000003 HC RX 258: Performed by: NURSE PRACTITIONER

## 2022-08-22 PROCEDURE — 70551 MRI BRAIN STEM W/O DYE: CPT

## 2022-08-22 PROCEDURE — 82948 REAGENT STRIP/BLOOD GLUCOSE: CPT

## 2022-08-22 PROCEDURE — 6360000004 HC RX CONTRAST MEDICATION: Performed by: NURSE PRACTITIONER

## 2022-08-22 PROCEDURE — 70498 CT ANGIOGRAPHY NECK: CPT

## 2022-08-22 RX ORDER — 0.9 % SODIUM CHLORIDE 0.9 %
500 INTRAVENOUS SOLUTION INTRAVENOUS ONCE
Status: COMPLETED | OUTPATIENT
Start: 2022-08-22 | End: 2022-08-22

## 2022-08-22 RX ORDER — ONDANSETRON 2 MG/ML
4 INJECTION INTRAMUSCULAR; INTRAVENOUS ONCE
Status: COMPLETED | OUTPATIENT
Start: 2022-08-22 | End: 2022-08-22

## 2022-08-22 RX ORDER — MECLIZINE HYDROCHLORIDE CHEWABLE TABLETS 25 MG/1
25 TABLET, CHEWABLE ORAL ONCE
Status: COMPLETED | OUTPATIENT
Start: 2022-08-22 | End: 2022-08-22

## 2022-08-22 RX ORDER — MECLIZINE HYDROCHLORIDE 25 MG/1
25 TABLET ORAL 3 TIMES DAILY PRN
Qty: 30 TABLET | Refills: 0 | Status: SHIPPED | OUTPATIENT
Start: 2022-08-22 | End: 2022-08-22

## 2022-08-22 RX ORDER — ONDANSETRON 4 MG/1
4 TABLET, FILM COATED ORAL 3 TIMES DAILY PRN
Qty: 15 TABLET | Refills: 0 | Status: SHIPPED | OUTPATIENT
Start: 2022-08-22 | End: 2022-08-22

## 2022-08-22 RX ORDER — METOCLOPRAMIDE 10 MG/1
10 TABLET ORAL
Status: DISCONTINUED | OUTPATIENT
Start: 2022-08-22 | End: 2022-08-22

## 2022-08-22 RX ORDER — AMOXICILLIN AND CLAVULANATE POTASSIUM 500; 125 MG/1; MG/1
1 TABLET, FILM COATED ORAL 3 TIMES DAILY
Qty: 30 TABLET | Refills: 0 | Status: SHIPPED | OUTPATIENT
Start: 2022-08-22 | End: 2022-09-01

## 2022-08-22 RX ORDER — DIAZEPAM 5 MG/1
5 TABLET ORAL ONCE
Status: COMPLETED | OUTPATIENT
Start: 2022-08-22 | End: 2022-08-22

## 2022-08-22 RX ORDER — AMOXICILLIN AND CLAVULANATE POTASSIUM 500; 125 MG/1; MG/1
1 TABLET, FILM COATED ORAL 3 TIMES DAILY
Qty: 30 TABLET | Refills: 0 | Status: SHIPPED | OUTPATIENT
Start: 2022-08-22 | End: 2022-08-22

## 2022-08-22 RX ORDER — METOCLOPRAMIDE 10 MG/1
10 TABLET ORAL ONCE
Status: COMPLETED | OUTPATIENT
Start: 2022-08-22 | End: 2022-08-22

## 2022-08-22 RX ORDER — ONDANSETRON 4 MG/1
4 TABLET, FILM COATED ORAL 3 TIMES DAILY PRN
Qty: 15 TABLET | Refills: 0 | Status: SHIPPED | OUTPATIENT
Start: 2022-08-22

## 2022-08-22 RX ORDER — MECLIZINE HYDROCHLORIDE 25 MG/1
25 TABLET ORAL 3 TIMES DAILY PRN
Qty: 30 TABLET | Refills: 0 | Status: SHIPPED | OUTPATIENT
Start: 2022-08-22 | End: 2022-09-01

## 2022-08-22 RX ADMIN — MECLIZINE HYDROCHLORIDE 25 MG: 25 TABLET, CHEWABLE ORAL at 13:08

## 2022-08-22 RX ADMIN — ONDANSETRON 4 MG: 2 INJECTION INTRAMUSCULAR; INTRAVENOUS at 13:08

## 2022-08-22 RX ADMIN — DIAZEPAM 5 MG: 5 TABLET ORAL at 16:30

## 2022-08-22 RX ADMIN — METOCLOPRAMIDE 10 MG: 10 TABLET ORAL at 16:30

## 2022-08-22 RX ADMIN — IOPAMIDOL 80 ML: 755 INJECTION, SOLUTION INTRAVENOUS at 13:31

## 2022-08-22 RX ADMIN — SODIUM CHLORIDE 500 ML: 9 INJECTION, SOLUTION INTRAVENOUS at 13:05

## 2022-08-22 ASSESSMENT — ENCOUNTER SYMPTOMS
ABDOMINAL PAIN: 0
EYE DISCHARGE: 0
SHORTNESS OF BREATH: 0
DIARRHEA: 0
VOMITING: 1
EYE PAIN: 0
RHINORRHEA: 0
NAUSEA: 1
WHEEZING: 0
SORE THROAT: 0
PHOTOPHOBIA: 0
ABDOMINAL DISTENTION: 0
COUGH: 0

## 2022-08-22 ASSESSMENT — PAIN - FUNCTIONAL ASSESSMENT
PAIN_FUNCTIONAL_ASSESSMENT: NONE - DENIES PAIN

## 2022-08-22 NOTE — ED NOTES
Urine specimen collected. Pt updated on plan of care. No concerns voiced.      Roman Olson RN  08/22/22 [id number]

## 2022-08-22 NOTE — CONSULTS
Neurology Consult Note    Date:8/22/2022       Room:11/011A  Patient Glenna Mccarthy Dr.     YOB: 1950     Age:71 y.o. Requesting Physician: No att. providers found     Reason for Consult:  Evaluate for posterior circulation stroke      Chief Complaint:   Chief Complaint   Patient presents with    Dizziness    Nausea       Subjective     La Bender Dr. is a 80 y.o. male with a history of DM, HTN, hypercholesterolemia, who presented to [facility] ED today complaining of dizziness, nausea/vomiting, diaphoresis, ataxic gait this morning upon awakening. ED physicians with concerns for posterior circulation stroke. LKW last night around 2230 prior to bed. Report NIH of 0. Deny truncal ataxia or weakness. Patient reports upon opening his eyes first thing this AM, he was dizzy, nauseous and the room was seemingly spinning around him prior to sitting up in bed. Also reports waking up drenched in sweat. Reports trying to make his way to the bathroom to change his clothes, but had difficulty walking due to dizziness and generalized fatigue and needed to hang onto furniture to make it. Reports continuing to feel dizzy with associated room spinning while sitting down on the toilet to rest. Patient thought it could be related to low blood sugar, but  prior to eating. Patient tried to eat, but took one bite and immediately vomited. Denies chest pain, weakness, or confusion/altered mentation. Patient states the nausea has now improved and the dizziness is beginning to improve, as well. He denies visual loss, but does admit to intermittent \"waves\" or lines of decreased focus/increased blurriness in his vision, bilaterally. Later reports this as a chronic finding over years, typically occurring with long-standing small tasks requiring the eyes to focus or starring at a computer for too long. When he stands, he reports swaying from side to side, but not falling to one side or the other.  He denies feeling as if he tablet up to max of 3 total doses. If no relief after 1 dose, call 911. 25 tablet 3    rosuvastatin (CRESTOR) 10 MG tablet Take 1 tablet by mouth daily (Patient taking differently: Take 20 mg by mouth daily) 90 tablet 3    blood glucose test strips (COOL BLOOD GLUCOSE TEST STRIPS) strip 1 each by In Vitro route 2 times daily As needed. 100 each 3    docusate sodium (COLACE) 100 MG capsule Take 100 mg by mouth daily as needed for Constipation      aspirin 81 MG tablet Take 1 tablet by mouth daily 1 tablet 0    zolpidem (AMBIEN) 10 MG tablet Take 10 mg by mouth nightly as needed for Sleep.      clonazePAM (KLONOPIN) 0.5 MG tablet Take 0.5 mg by mouth nightly as needed. cetirizine (ZYRTEC) 10 MG tablet Take 10 mg by mouth as needed        Past History    Past Medical History:   has a past medical history of Diabetes mellitus ([facility] [address] Social History:   reports that he has quit smoking. His smoking use included cigarettes. He has a 8.00 pack-year smoking history. He has never used smokeless tobacco. He reports current alcohol use. He reports that he does not use drugs. Family History: History reviewed. No pertinent family history. Physical Examination      Vitals:  BP (!) 156/76   Pulse 73   Temp 97.4 °F (36.3 °C) (Oral)   Resp 18   Ht 5' 7\" (1.702 m)   Wt 170 lb (77.1 kg)   SpO2 97%   BMI 26.63 kg/m²   Temp (24hrs), Av.4 °F (36.3 °C), Min:97.4 °F (36.3 °C), Max:97.4 °F (36.3 °C)      I/O (24Hr): No intake or output data in the 24 hours ending 22      Physical Exam  Vitals reviewed. Constitutional:       General: He is not in acute distress. Appearance: Normal appearance. He is not ill-appearing. HENT:      Head: Normocephalic and atraumatic. Right Ear: External ear normal.      Left Ear: External ear normal.      Nose: Nose normal.      Mouth/Throat:      Mouth: Mucous membranes are moist.      Pharynx: No oropharyngeal exudate or posterior oropharyngeal erythema.    Eyes: Extraocular Movements: Extraocular movements intact and EOM normal.      Pupils: Pupils are equal, round, and reactive to light. Cardiovascular:      Rate and Rhythm: Normal rate and regular rhythm. Heart sounds: Normal heart sounds. No murmur heard. Pulmonary:      Effort: Pulmonary effort is normal. No respiratory distress. Breath sounds: Normal breath sounds. No wheezing. Abdominal:      General: Bowel sounds are normal.      Palpations: Abdomen is soft. Tenderness: There is no abdominal tenderness. Musculoskeletal:         General: Normal range of motion. Cervical back: Normal range of motion. Right lower leg: No edema. Left lower leg: No edema. Skin:     General: Skin is warm. Findings: No rash. Neurological:      Mental Status: He is alert and oriented to person, place, and time. Coordination: Finger-Nose-Finger Test and Heel to [facility] Test normal.      Deep Tendon Reflexes: Strength normal.   Psychiatric:         Mood and Affect: Mood normal.         Speech: Speech normal.         Behavior: Behavior normal.     Neurologic Exam     Mental Status   Oriented to person, place, and time. Follows 1 step commands. Attention: normal. Concentration: normal.   Speech: speech is normal   Level of consciousness: alert  Knowledge: good. Normal comprehension. Cranial Nerves     CN II   Visual fields full to confrontation. Right visual field deficit: none  Left visual field deficit: none     CN III, IV, VI   Pupils are equal, round, and reactive to light. Extraocular motions are normal.     CN V   Facial sensation intact. Right facial sensation deficit: none  Left facial sensation deficit: none    CN VII   Facial expression full, symmetric.    Right facial weakness: none  Left facial weakness: none    CN VIII   CN VIII normal.   Hearing: intact    CN IX, X   CN IX normal.   Palate: symmetric    CN XI   CN XI normal.   Right trapezius strength: normal  Left trapezius strength: normal    CN XII   CN XII normal.   Tongue: not atrophic  Fasciculations: absent  Tongue deviation: none    Motor Exam   Muscle bulk: normal  Overall muscle tone: normal  Right arm pronator drift: absent  Left arm pronator drift: absent    Strength   Strength 5/5 throughout. Sensory Exam   Light touch normal.     Gait, Coordination, and Reflexes     Coordination   Finger to nose coordination: normal  Heel to shin coordination: normal    Tremor   Resting tremor: absent     Labs/Imaging/Diagnostics   Labs:  CBC:  Recent Labs     08/22/22  1154   WBC 7.0   RBC 5.37   HGB 13.6*   HCT 45.7   MCV 85.1        CHEMISTRIES:  Recent Labs     08/22/22  1130      K 4.3      CO2 24   BUN 19   CREATININE 0.9   GLUCOSE 220*     COAGULATION STUDIES:No results for input(s): PROTIME, INR, APTT in the last 72 hours. LIVER PROFILE:No results for input(s): AST, ALT, BILIDIR, BILITOT, ALKPHOS in the last 72 hours. CHOLESTEROL AND A1C:  Recent Labs     08/22/22  1154   LABA1C 8.4*      Imaging Last 24 Hours:  CTA HEAD W WO CONTRAST    Result Date: 8/22/2022  PROCEDURE: CTA HEAD W WO CONTRAST, CTA NECK W WO CONTRAST CLINICAL INFORMATION: dizziness , ataxia, no positional vertigo . COMPARISON: CT head from the same date. TECHNIQUE: Helical CT from the aortic arch through the head in arterial phase during fast bolus administration of 80 mL Isovue-370 injected in the right forearm with multiplanar reconstructions to include volumetric maximum intensity projection sequences. All CT scans at this facility use dose modulation, iterative reconstruction, and/or weight-based dosing when appropriate to reduce radiation dose to as low as reasonably achievable. FINDINGS: CTA HEAD: There is mild mural calcification in the cavernous and clinoid segments of the internal carotid arteries without flow-limiting stenosis or aneurysm.  The bilateral middle cerebral arteries are patent without focal abnormality identified. Anterior cerebral  arteries are patent without focal stenosis. The left A1 segment is diminutive likely on the basis of normal anatomic variation. The basilar artery is patent without focal stenosis or visualized aneurysm. The bilateral posterior cerebral and superior cerebellar arteries are patent without focal abnormality identified. Dural venous sinuses are patent without focal filling defect. No focal areas of abnormal parenchymal enhancement are identified. CTA NECK: There is a typical 3 vessel arch. The brachiocephalic and left subclavian arteries are patent without flow-limiting stenosis. Common carotid arteries are patent without focal stenosis. Carotid bifurcations are widely patent without hemodynamic likely significant stenosis by NASCET criteria. Cervical segments of internal carotid arteries are patent without focal stenosis. The proximal cervical segments are tortuous. The vertebral arteries are codominant. They're patent throughout their course without focal stenosis. There is streak artifact from dental amalgam which partially obscures oral and perioral soft tissues. Given this caveat, mucosal surfaces of the aerodigestive tract are symmetric without focal nodular thickening or visualized mass. No cervical lymphadenopathy is identified. The parotid, submandibular and thyroid glands are unremarkable. Visualized portions of the lungs are clear. There are moderate degenerative changes of the cervical spine. Essentially normal CTA of the head and neck. **This report has been created using voice recognition software. It may contain minor errors which are inherent in voice recognition technology. ** Final report electronically signed by Dr. Jeyson Rizo MD on 8/22/2022 2:12 PM    XR CHEST (2 VW)    Result Date: 8/22/2022  PROCEDURE: XR CHEST (2 VW) CLINICAL INFORMATION: cough, diaphoresis. COMPARISON: Chest x-ray dated 2/13/2018. ..  TECHNIQUE: PA and lateral views the chest. FINDINGS: There is cardiomegaly. .  There is atherosclerotic calcification in aortic arch. .  There is thickening off the interstitial lung markings and evidence for granulomatous disease in the left midlung field. There are no superimposed infiltrates or effusions. The pulmonary vascularity is normal. There is thoracic spondylosis. Isaura Killian 1. Cardiomegaly. 2. Thickening off the interstitial lung markings and evidence for granulomatous disease in the left midlung field. No acute infiltrates or effusions. 3. Atherosclerotic calcification in the aortic arch. 4. Thoracic spondylosis. Isaura Killian **This report has been created using voice recognition software. It may contain minor errors which are inherent in voice recognition technology. ** Final report electronically signed by DR King Martínez on 8/22/2022 4:44 PM    CT HEAD WO CONTRAST    Result Date: 8/22/2022  PROCEDURE: CT HEAD WO CONTRAST CLINICAL INFORMATION: dizziness, ataxia. COMPARISON: No prior study. TECHNIQUE: Noncontrast 5 mm axial images were obtained through the brain. Sagittal and coronal reconstructions were obtained. All CT scans at this facility use dose modulation, iterative reconstruction, and/or weight-based dosing when appropriate to reduce radiation dose to as low as reasonably achievable. FINDINGS: There is mild atrophy. There is diminished attenuation in the white matter most likely representing ischemic changes. There is a prominent CSF space in the posterior fossa on the right side suggestive of a elissa cisterna magna. There is no hemorrhage. There is no hydrocephalus, midline shift or mass effect. The paranasal sinuses and mastoid air cells are normally aerated. There is no suspicious calvarial abnormality. 1. Mild atrophy. 2. Probable ischemic changes in the white matter. 3. 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 4. Otherwise negative noncontrast CT scan of the brain.  **This report has been created using voice recognition software. It may contain minor errors which are inherent in voice recognition technology. ** Final report electronically signed by DR Jed eMdrano on 8/22/2022 1:58 PM    CTA NECK W WO CONTRAST    Result Date: 8/22/2022  PROCEDURE: CTA HEAD W WO CONTRAST, CTA NECK W WO CONTRAST CLINICAL INFORMATION: dizziness , ataxia, no positional vertigo . COMPARISON: CT head from the same date. TECHNIQUE: Helical CT from the aortic arch through the head in arterial phase during fast bolus administration of 80 mL Isovue-370 injected in the right forearm with multiplanar reconstructions to include volumetric maximum intensity projection sequences. All CT scans at this facility use dose modulation, iterative reconstruction, and/or weight-based dosing when appropriate to reduce radiation dose to as low as reasonably achievable. FINDINGS: CTA HEAD: There is mild mural calcification in the cavernous and clinoid segments of the internal carotid arteries without flow-limiting stenosis or aneurysm. The bilateral middle cerebral arteries are patent without focal abnormality identified. Anterior cerebral  arteries are patent without focal stenosis. The left A1 segment is diminutive likely on the basis of normal anatomic variation. The basilar artery is patent without focal stenosis or visualized aneurysm. The bilateral posterior cerebral and superior cerebellar arteries are patent without focal abnormality identified. Dural venous sinuses are patent without focal filling defect. No focal areas of abnormal parenchymal enhancement are identified. CTA NECK: There is a typical 3 vessel arch. The brachiocephalic and left subclavian arteries are patent without flow-limiting stenosis. Common carotid arteries are patent without focal stenosis. Carotid bifurcations are widely patent without hemodynamic likely significant stenosis by NASCET criteria. Cervical segments of internal carotid arteries are patent without focal stenosis.  The proximal cervical segments are tortuous. The vertebral arteries are codominant. They're patent throughout their course without focal stenosis. There is streak artifact from dental amalgam which partially obscures oral and perioral soft tissues. Given this caveat, mucosal surfaces of the aerodigestive tract are symmetric without focal nodular thickening or visualized mass. No cervical lymphadenopathy is identified. The parotid, submandibular and thyroid glands are unremarkable. Visualized portions of the lungs are clear. There are moderate degenerative changes of the cervical spine. Essentially normal CTA of the head and neck. **This report has been created using voice recognition software. It may contain minor errors which are inherent in voice recognition technology. ** Final report electronically signed by Dr. Kathleen Mathew MD on 8/22/2022 2:12 PM    MRI BRAIN WO CONTRAST    Result Date: 8/22/2022  PROCEDURE: MRI BRAIN WO CONTRAST INDICATION:  ataxia. Diaphoresis, fatigue and emesis this morning. COMPARISON: CT head from the same date. TECHNIQUE: Multiplanar and multiple spin echo MRI images were obtained of the brain without contrast. FINDINGS: There is moderate volume loss. There are mild patchy areas of T2/FLAIR prolongation in the periventricular and deep white matter. No intra or extra-axial mass is identified. No focal areas of restricted diffusion are present. The major vascular flow voids appear patent. Orbits are unremarkable. There is mild mucosal thickening in the maxillary sinuses. Mastoid air cells are clear. 1. No evidence of acute intracranial abnormality. 2. Mild severity chronic microvascular angiopathy superimposed on moderate volume loss. 3. Mild sinonasal mucosal inflammation. **This report has been created using voice recognition software. It may contain minor errors which are inherent in voice recognition technology. ** Final report electronically signed by Dr. Casandra Hernandez Efrem Matthews MD on 8/22/2022 3:51 PM     Assessment and Plan:        Dizziness, N/V, unsteady gait, likely inner ear abnormality  CTH: negative for acute intracranial abnormality  CTA H/N: negative for hemodynamically significant stenosis  MRI Brain WO: negative for acute intracranial abnormality  Recommend COVID-19 testing in ED  Recommend ENT consult for possible inner ear origin  HgbA1C: 8.4. Recommend tight control of diabetes with goal A1C <7.0   Restart ASA 81 mg daily. Patient expressing agreement. Neurologic work-up completed at this time. Neurology will sign off. Please call with questions or concerns. Thank you for this consult. This patient was seen & evaluated in conjunction with Dr. Soto Wheat who is in agreement with assessment and plan.      Electronically signed by Laurent Mcelroy PA-C on 8/22/22 at 7:40 PM EDT

## 2022-08-22 NOTE — ED TRIAGE NOTES
Woke up at 0330 this morning with diaphoresis and fatigue. Changed his shirt and went back to bed. Pt woke back up around 0800. He tried to eat some oatmeal for breakfast, but then vomited back up. Denies any sob/chest pain. States he just feels very tired. EKG completed upon arrival. Pt hooked up to monitor and telemetry.

## 2022-08-22 NOTE — Clinical Note
Nery Lopes was seen and treated in our emergency department on 8/22/2022. He may return to work on 08/26/2022. If you have any questions or concerns, please don't hesitate to call.       Tracy Roman MD

## 2022-08-22 NOTE — DISCHARGE INSTRUCTIONS
You should follow up for the incidental findings of cardiomegaly and granulomatosis disease.  You will likely need a CT chest with IV contrast and possibly an echocardiogram.

## 2022-08-22 NOTE — Clinical Note
Dora Hampton was seen and treated in our emergency department on 8/22/2022. He may return to work on 08/26/2022. If you have any questions or concerns, please don't hesitate to call.       Prabhakar Augustin MD

## 2022-08-22 NOTE — Clinical Note
Micah Salvador was seen and treated in our emergency department on 8/22/2022. He may return to work on 08/26/2022. If you have any questions or concerns, please don't hesitate to call.       Yaniv Goldstein MD

## 2022-08-22 NOTE — ED NOTES
Pt medicated per MAR. Updated on poc. Call light in reach. No concerns voiced.      Frederick Montano RN  08/22/22 1129

## 2022-08-23 NOTE — ED PROVIDER NOTES
[place] ENCOUNTER          Pt Name: Heydi El Dr.  MRN: [id number]  [place] 1950  Date of evaluation: 8/22/2022  Treating Resident Physician: Yee Segura MD  Supervising Physician: Esmer Lucas MD    History obtained from the patient. CHIEF COMPLAINT       Chief Complaint   Patient presents with    Dizziness    Nausea           HISTORY OF PRESENT ILLNESS    HPI  Heydi El Dr. is a 80 y.o. male who presents to the emergency department for evaluation of dizziness and nausea. Patient states that he woke up at approximately 02 30 with an episode of sweating diffusely. Patient states that this time he stood up to go change his close and became very dizzy and felt like he might fall. Patient states that he continued to feel dizzy throughout the day even at rest.  Patient also had an episode this morning of nausea and vomiting x1. Patient denies fever, chest pain, syncope. The patient has no other acute complaints at this time. REVIEW OF SYSTEMS   Review of Systems   Constitutional:  Negative for fatigue and fever. HENT:  Negative for congestion, ear pain, rhinorrhea and sore throat. Eyes:  Negative for pain and discharge. Respiratory:  Negative for cough, shortness of breath and wheezing. Cardiovascular:  Negative for chest pain, palpitations and leg swelling. Gastrointestinal:  Positive for nausea and vomiting. Negative for abdominal distention, abdominal pain and diarrhea. Genitourinary:  Negative for difficulty urinating, flank pain and frequency. Musculoskeletal:  Negative for arthralgias. Skin:  Negative for rash. Neurological:  Positive for dizziness and light-headedness. Negative for tremors, syncope, weakness and numbness.        PAST MEDICAL AND SURGICAL HISTORY     Past Medical History:   Diagnosis Date    Diabetes mellitus [facility])      Past Surgical History:   Procedure Laterality Date COLONOSCOPY N/A 6/15/2018    COLONOSCOPY POLYPECTOMY SNARE/COLD BIOPSY performed by Jayesh Garcia MD at [address]      right inguinal hernia repair         MEDICATIONS   No current facility-administered medications for this encounter. Current Outpatient Medications:     amoxicillin-clavulanate (AUGMENTIN) 500-125 MG per tablet, Take 1 tablet by mouth 3 times daily for 10 days, Disp: 30 tablet, Rfl: 0    meclizine (ANTIVERT) 25 MG tablet, Take 1 tablet by mouth 3 times daily as needed for Dizziness, Disp: 30 tablet, Rfl: 0    ondansetron (ZOFRAN) 4 MG tablet, Take 1 tablet by mouth 3 times daily as needed for Nausea or Vomiting, Disp: 15 tablet, Rfl: 0    metFORMIN (GLUCOPHAGE) 1000 MG tablet, Take 1 tablet by mouth 2 times daily (with meals), Disp: 180 tablet, Rfl: 3    metoprolol succinate (TOPROL XL) 25 MG extended release tablet, Take 1 tablet by mouth daily, Disp: 30 tablet, Rfl: 3    nitroGLYCERIN (NITROSTAT) 0.4 MG SL tablet, up to max of 3 total doses. If no relief after 1 dose, call 911., Disp: 25 tablet, Rfl: 3    rosuvastatin (CRESTOR) 10 MG tablet, Take 1 tablet by mouth daily (Patient taking differently: Take 20 mg by mouth daily), Disp: 90 tablet, Rfl: 3    blood glucose test strips (COOL BLOOD GLUCOSE TEST STRIPS) strip, 1 each by In Vitro route 2 times daily As needed. , Disp: 100 each, Rfl: 3    docusate sodium (COLACE) 100 MG capsule, Take 100 mg by mouth daily as needed for Constipation, Disp: , Rfl:     aspirin 81 MG tablet, Take 1 tablet by mouth daily, Disp: 1 tablet, Rfl: 0    zolpidem (AMBIEN) 10 MG tablet, Take 10 mg by mouth nightly as needed for Sleep., Disp: , Rfl:     clonazePAM (KLONOPIN) 0.5 MG tablet, Take 0.5 mg by mouth nightly as needed. , Disp: , Rfl:     cetirizine (ZYRTEC) 10 MG tablet, Take 10 mg by mouth as needed , Disp: , Rfl:       SOCIAL HISTORY     Social History     Social History Narrative    Not on file     Social History     Tobacco Use Smoking status: Former     Packs/day: 1.00     Years: 8.00     Pack years: 8.00     Types: Cigarettes    Smokeless tobacco: Never   Vaping Use    Vaping Use: Never used   Substance Use Topics    Alcohol use: Yes     Comment: Occasioanlly    Drug use: No         ALLERGIES   No Known Allergies      FAMILY HISTORY   History reviewed. No pertinent family history. PREVIOUS RECORDS   Previous records reviewed:  2/6/21 thru current  . PHYSICAL EXAM     ED Triage Vitals [08/22/22 1119]   BP Temp Temp Source Heart Rate Resp SpO2 Height Weight   (!) 148/98 97.4 °F (36.3 °C) Oral 69 25 95 % 5' 7\" (1.702 m) 170 lb (77.1 kg)     Initial vital signs and nursing assessment reviewed and abnormal from hypertension . Body mass index is 26.63 kg/m². Pulsoximetry is normal per my interpretation. Additional Vital Signs:  Vitals:    08/22/22 1639   BP: (!) 156/76   Pulse: 73   Resp: 18   Temp:    SpO2: 97%       Physical Exam  Constitutional:       Appearance: Normal appearance. HENT:      Head: Normocephalic. Right Ear: External ear normal.      Left Ear: External ear normal.      Ears:      Comments: Erythema to left TM     Nose: Nose normal.      Mouth/Throat:      Mouth: Mucous membranes are moist.      Pharynx: Oropharynx is clear. Eyes:      Conjunctiva/sclera: Conjunctivae normal.      Pupils: Pupils are equal, round, and reactive to light. Cardiovascular:      Rate and Rhythm: Normal rate and regular rhythm. Pulses: Normal pulses. Heart sounds: Normal heart sounds. Pulmonary:      Effort: Pulmonary effort is normal.      Breath sounds: Normal breath sounds. Abdominal:      General: Bowel sounds are normal.      Palpations: Abdomen is soft. Musculoskeletal:         General: Normal range of motion. Cervical back: Normal range of motion and neck supple. Skin:     General: Skin is warm and dry. Capillary Refill: Capillary refill takes less than 2 seconds.    Neurological: granulomatous disease in the left midlung field. No acute infiltrates or effusions. 3. Atherosclerotic calcification in the aortic arch. 4. Thoracic spondylosis. [place] **This report has been created using voice recognition software. It may contain minor errors which are inherent in voice recognition technology. **      Final report electronically signed by DR Radha Brandon on 8/22/2022 4:44 PM      MRI BRAIN WO CONTRAST   Final Result       1. No evidence of acute intracranial abnormality. 2. Mild severity chronic microvascular angiopathy superimposed on moderate volume loss. 3. Mild sinonasal mucosal inflammation. **This report has been created using voice recognition software. It may contain minor errors which are inherent in voice recognition technology. **      Final report electronically signed by Dr. Teja Sanches MD on 8/22/2022 3:51 PM      CT HEAD WO CONTRAST   Final Result      1. Mild atrophy. 2. Probable ischemic changes in the white matter. 3. 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 4. Otherwise negative noncontrast CT scan of the brain. **This report has been created using voice recognition software. It may contain minor errors which are inherent in voice recognition technology. **      Final report electronically signed by DR Radha Brandon on 8/22/2022 1:58 PM      CTA HEAD W WO CONTRAST   Final Result    Essentially normal CTA of the head and neck. **This report has been created using voice recognition software. It may contain minor errors which are inherent in voice recognition technology. **      Final report electronically signed by Dr. Teja Sanches MD on 8/22/2022 2:12 PM      CTA NECK W WO CONTRAST   Final Result    Essentially normal CTA of the head and neck. **This report has been created using voice recognition software.  It may contain minor errors which are inherent in voice recognition technology. **      Final report electronically signed by Dr. Iraida Rankin MD on 8/22/2022 2:12 PM          ED Medications administered this visit:   Medications   ondansetron [facility]) injection 4 mg (4 mg IntraVENous Given 8/22/22 1308)   0.9 % sodium chloride bolus (0 mLs IntraVENous Stopped 8/22/22 1526)   meclizine (ANTIVERT) chewable tablet 25 mg (25 mg Oral Given 8/22/22 1308)   metoclopramide (REGLAN) tablet 10 mg (10 mg Oral Given 8/22/22 1630)   iopamidol (ISOVUE-370) 76 % injection 80 mL (80 mLs IntraVENous Given 8/22/22 1331)   diazePAM (VALIUM) tablet 5 mg (5 mg Oral Given 8/22/22 1630)         ED COURSE      Neuro intervention team came and saw patient and recommended MRI of brain at this time. MRI brain is negative and neuro intervention team recommended outpatient follow-up with with ENT for otitis media of the left ear. Patient received medication while in the ED that was effective for her discomfort and dizziness improved. Discussed with patient both lab and imaging findings. Also discussed incidental finding of cardiomegaly and granulomatosis disease with recommendations to follow-up outpatient for possible echocardiogram or additional CT imaging. Patient verbalized good understanding and will be discharged home at this time. Strict return precautions and follow up instructions were discussed with the patient prior to discharge, with which the patient agrees. MEDICATION CHANGES     Discharge Medication List as of 8/22/2022  5:23 PM            FINAL DISPOSITION     Final diagnoses:   Vertigo   Acute serous otitis media of left ear, recurrence not specified     Condition: condition: good  Dispo: Discharge to home      This transcription was electronically signed. Parts of this transcriptions may have been dictated by use of voice recognition software and electronically transcribed, and parts may have been transcribed with the assistance of an ED scribe.  The transcription may contain errors not detected in proofreading. Please refer to my supervising physician's documentation if my documentation differs.     Electronically Signed: Yusra Chan MD, 08/22/22, 9:05 PM        Yusra Chan MD  Resident  08/22/22 0873

## 2023-11-06 LAB
AVERAGE GLUCOSE: NORMAL
HBA1C MFR BLD: 6.5 %

## 2024-09-17 ENCOUNTER — TRANSCRIBE ORDERS (OUTPATIENT)
Dept: ADMINISTRATIVE | Age: 74
End: 2024-09-17

## 2024-09-17 DIAGNOSIS — R00.2 PALPITATIONS: Primary | ICD-10-CM

## 2024-09-26 ENCOUNTER — HOSPITAL ENCOUNTER (OUTPATIENT)
Age: 74
Discharge: HOME | End: 2024-09-28
Attending: INTERNAL MEDICINE
Payer: MEDICARE

## 2024-09-26 DIAGNOSIS — R00.2 PALPITATIONS: ICD-10-CM

## 2024-09-26 PROCEDURE — 93270 REMOTE 30 DAY ECG REV/REPORT: CPT

## (undated) DEVICE — SET LNR RED GRN W/ BASE CLEANASCOPE

## (undated) DEVICE — SUREFIT, DUAL DISPERSIVE ELECTRODE, CONTACT QUALITY MONITOR: Brand: SUREFIT

## (undated) DEVICE — TUBING IV STOPCOCK 48 CM 3 W

## (undated) DEVICE — ENDO KIT: Brand: MEDLINE INDUSTRIES, INC.

## (undated) DEVICE — CANISTER, RIGID, 2000CC: Brand: MEDLINE INDUSTRIES, INC.

## (undated) DEVICE — SET ADMIN 25ML L117IN PMP MOD CK VLV RLER CLMP 2 SMRTSITE

## (undated) DEVICE — CATHETER ETER IV 22GA L1IN POLYUR STR RADPQ INTROCAN SFTY

## (undated) DEVICE — SOLUTION IV 1000ML 0.45% SOD CHL PH 5 INJ USP VIAFLX PLAS

## (undated) DEVICE — TRAP POLYP ETRAP

## (undated) DEVICE — IV START KIT: Brand: MEDLINE INDUSTRIES, INC.

## (undated) DEVICE — SNARE POLYP SM W13MMXL240CM SHTH DIA2.4MM OVL FLX DISP